# Patient Record
Sex: FEMALE | Race: WHITE | NOT HISPANIC OR LATINO | Employment: FULL TIME | URBAN - NONMETROPOLITAN AREA
[De-identification: names, ages, dates, MRNs, and addresses within clinical notes are randomized per-mention and may not be internally consistent; named-entity substitution may affect disease eponyms.]

---

## 2017-01-23 ENCOUNTER — TREATMENT (OUTPATIENT)
Dept: CARDIOLOGY | Facility: CLINIC | Age: 42
End: 2017-01-23

## 2017-01-23 DIAGNOSIS — R00.2 HEART PALPITATIONS: Primary | ICD-10-CM

## 2017-01-24 ENCOUNTER — OFFICE VISIT (OUTPATIENT)
Dept: CARDIOLOGY | Facility: CLINIC | Age: 42
End: 2017-01-24

## 2017-01-24 VITALS
SYSTOLIC BLOOD PRESSURE: 121 MMHG | HEART RATE: 81 BPM | OXYGEN SATURATION: 98 % | BODY MASS INDEX: 42 KG/M2 | WEIGHT: 246 LBS | RESPIRATION RATE: 16 BRPM | DIASTOLIC BLOOD PRESSURE: 80 MMHG | HEIGHT: 64 IN

## 2017-01-24 DIAGNOSIS — R00.2 HEART PALPITATIONS: Primary | ICD-10-CM

## 2017-01-24 PROCEDURE — 99213 OFFICE O/P EST LOW 20 MIN: CPT | Performed by: INTERNAL MEDICINE

## 2017-01-24 NOTE — PROGRESS NOTES
Rajesh Palmer MD  Anna Urbanen  1975 01/24/2017    Patient Active Problem List   Diagnosis   • Gougerout-Sjoegren syndrome   • Breath shortness   • Acute bronchitis   • Heart palpitations       Dear Rajesh Palmer MD:    Subjective     Anna Andres is a 41 y.o. female with the above medical problems who is here today for follow-up.  Patient is an extensive history of episodes of palpitations in the past.  According to the patient she describes these episodes as her feeling anxious and jittery.  She does report a past heart rate at the time.  She states there blood pressure has been checked in multiple occasions and has been noted to be normal during the episodes.  She states that after 20-30 minutes the episodes resolve spontaneously.  She has undergone multiple evaluations including cardiac catheterization, multiple Holter test, 30 day event monitor, loop recorder testing as well as multiple echocardiograms, all of which have failed to show any evidence of cardiac etiology for this.      Current Outpatient Prescriptions:   •  albuterol (PROVENTIL HFA;VENTOLIN HFA) 108 (90 BASE) MCG/ACT inhaler, Inhale 2 puffs every 4 (four) hours as needed for wheezing., Disp: , Rfl:   •  azelastine (ASTELIN) 0.1 % nasal spray, 2 sprays into each nostril 2 (two) times a day. Use in each nostril as directed, Disp: , Rfl:   •  cyanocobalamin 1000 MCG/ML injection, INJ 1 ML IM Q OTHER WEEK, Disp: , Rfl: 3  •  diclofenac (VOLTAREN) 3 % gel gel, Apply 4 g topically. for 60 days., Disp: , Rfl:   •  DULoxetine (CYMBALTA) 60 MG capsule, TK ONE C PO  QD, Disp: , Rfl: 1  •  fluticasone (FLONASE) 50 MCG/ACT nasal spray, 2 sprays into each nostril daily. Administer 2 sprays in each nostril for each dose., Disp: , Rfl:   •  hydroxychloroquine (PLAQUENIL) 200 MG tablet, TK 1 T PO BID, Disp: , Rfl: 1  •  Ibuprofen-Famotidine (DUEXIS) 800-26.6 MG tablet, Take  by mouth 3 (three) times a day. prn, Disp: , Rfl:   •   labetalol (NORMODYNE) 100 MG tablet, 0.5 tab BID, Disp: 90 tablet, Rfl: 2  •  Loratadine 10 MG capsule, Take  by mouth., Disp: , Rfl:   •  montelukast (SINGULAIR) 10 MG tablet, TK 1 T PO  QPM, Disp: , Rfl: 3  •  Multiple Vitamin (MULTI VITAMIN DAILY PO), Take  by mouth., Disp: , Rfl:   •  naratriptan (AMERGE) 2.5 MG tablet, , Disp: , Rfl: 5  •  O2 (OXYGEN), Inhale 2 L/min every night., Disp: , Rfl:   •  Omega-3 Fatty Acids (FISH OIL) 1000 MG capsule capsule, Take 1,000 mg by mouth., Disp: , Rfl:   •  pantoprazole (PROTONIX) 40 MG EC tablet, TK 1 T PO QD, Disp: , Rfl: 3  •  PULMICORT FLEXHALER 180 MCG/ACT inhaler, USE 1 PUFF PO BID UTD, Disp: , Rfl: 3  •  RESTASIS 0.05 % ophthalmic emulsion, INT 1 GTT IN OU BID, Disp: , Rfl: 3  •  vitamin D (ERGOCALCIFEROL) 79310 UNITS capsule capsule, TK ONE C PO  Q WEEK, Disp: , Rfl: 4    The following portions of the patient's history were reviewed and updated as appropriate: allergies, current medications, past family history, past medical history, past social history, past surgical history and problem list.    Review of Systems   Constitution: Positive for malaise/fatigue. Negative for chills and fever.   HENT: Positive for congestion and headaches (hx migraines). Negative for nosebleeds.    Eyes: Positive for visual disturbance (only with headaches). Negative for blurred vision, pain and redness.   Cardiovascular: Positive for leg swelling. Negative for chest pain, dyspnea on exertion, irregular heartbeat, near-syncope, orthopnea, palpitations, paroxysmal nocturnal dyspnea and syncope.   Respiratory: Positive for cough (occasional), shortness of breath, sleep disturbances due to breathing and wheezing (occasional).    Skin: Negative for rash.   Musculoskeletal: Positive for arthritis, back pain, joint pain and stiffness. Negative for neck pain.   Gastrointestinal: Negative for bloating, abdominal pain, change in bowel habit, constipation, diarrhea, hematemesis, hematochezia,  "melena, nausea and vomiting.   Genitourinary: Negative for dysuria.   Neurological: Positive for dizziness (standing quickly) and light-headedness.   Psychiatric/Behavioral: Negative for depression. The patient does not have insomnia and is not nervous/anxious.        Objective   Blood pressure 121/80, pulse 81, resp. rate 16, height 64\" (162.6 cm), weight 246 lb (112 kg), SpO2 98 %.    Physical Exam   Constitutional: She is oriented to person, place, and time. She appears well-developed and well-nourished.   Obese white female sitting comfortably on chair.   HENT:   Mouth/Throat: Oropharynx is clear and moist.   Eyes: EOM are normal. Pupils are equal, round, and reactive to light.   Neck: Neck supple. No JVD present. No tracheal deviation present. No thyromegaly present.   Cardiovascular: Normal rate, regular rhythm, S1 normal and S2 normal.  Exam reveals no gallop and no friction rub.    No murmur heard.  Pulmonary/Chest: Effort normal and breath sounds normal. No respiratory distress. She has no wheezes. She has no rales.   Abdominal: Soft. Bowel sounds are normal. She exhibits no mass. There is no tenderness.   Musculoskeletal: Normal range of motion. She exhibits no edema.   Lymphadenopathy:     She has no cervical adenopathy.   Neurological: She is alert and oriented to person, place, and time.   Skin: Skin is warm and dry. No rash noted.   Psychiatric: She has a normal mood and affect.       Procedures    Assessment/Plan     1.  Heart palpitations: Patient with a reported episode of palpitations that have not been shown to be related to arrhythmias.  After extensive cardiac testing.  She has also underwent valuation for occult ischemia which has been negative as well as structural heart abnormalities which has been negative.  At this point it appears very unlikely that the patient's episodes are related to a cardiac etiology.  He appears to be related to hormonal versus psychiatric problems.  Further " evaluation is to be done by primary care.  She will need removal of LINQ device once battery runs out, or in the meantime he would continue to provide evaluation for arrhythmias.     Diagnosis Plan   1. Heart palpitations              Return if symptoms worsen or fail to improve.    I appreciate the opportunity to participate in this patient's cardiovascular care.    Best Regards    Abel Valderrama

## 2017-01-24 NOTE — LETTER
January 24, 2017     Rajesh Palmer MD  [address] [facility] [place] [address]    Patient: Anna Andres   YOB: 1975   Date of Visit: 1/24/2017       Dear Dr. Spencer MD:    Thank you for referring Anna Andres to me for evaluation. Below are the relevant portions of my assessment and plan of care.    If you have questions, please do not hesitate to call me. I look forward to following Anna along with you.         Sincerely,        Abel Bustamante MD        CC: No Recipients  Abel Bustamante MD  1/24/2017  4:37 PM  Signed  Rajesh Palmer MD  Anna Urbanen  1975 01/24/2017    Patient Active Problem List   Diagnosis   • Gougerout-Sjoegren syndrome   • Breath shortness   • Acute bronchitis   • Heart palpitations       Dear Rajesh Palmer MD:    Subjective     Anna Andres is a 41 y.o. female with the above medical problems who is here today for follow-up.  Patient is an extensive history of episodes of palpitations in the past.  According to the patient she describes these episodes as her feeling anxious and jittery.  She does report a past heart rate at the time.  She states there blood pressure has been checked in multiple occasions and has been noted to be normal during the episodes.  She states that after 20-30 minutes the episodes resolve spontaneously.  She has undergone multiple evaluations including cardiac catheterization, multiple Holter test, 30 day event monitor, loop recorder testing as well as multiple echocardiograms, all of which have failed to show any evidence of cardiac etiology for this.      Current Outpatient Prescriptions:   •  albuterol (PROVENTIL HFA;VENTOLIN HFA) 108 (90 BASE) MCG/ACT inhaler, Inhale 2 puffs every 4 (four) hours as needed for wheezing., Disp: , Rfl:   •  azelastine (ASTELIN) 0.1 % nasal spray, 2 sprays into each nostril 2 (two) times a day. Use in each nostril as directed, Disp: , Rfl:   •   cyanocobalamin 1000 MCG/ML injection, INJ 1 ML IM Q OTHER WEEK, Disp: , Rfl: 3  •  diclofenac (VOLTAREN) 3 % gel gel, Apply 4 g topically. for 60 days., Disp: , Rfl:   •  DULoxetine (CYMBALTA) 60 MG capsule, TK ONE C PO  QD, Disp: , Rfl: 1  •  fluticasone (FLONASE) 50 MCG/ACT nasal spray, 2 sprays into each nostril daily. Administer 2 sprays in each nostril for each dose., Disp: , Rfl:   •  hydroxychloroquine (PLAQUENIL) 200 MG tablet, TK 1 T PO BID, Disp: , Rfl: 1  •  Ibuprofen-Famotidine (DUEXIS) 800-26.6 MG tablet, Take  by mouth 3 (three) times a day. prn, Disp: , Rfl:   •  labetalol (NORMODYNE) 100 MG tablet, 0.5 tab BID, Disp: 90 tablet, Rfl: 2  •  Loratadine 10 MG capsule, Take  by mouth., Disp: , Rfl:   •  montelukast (SINGULAIR) 10 MG tablet, TK 1 T PO  QPM, Disp: , Rfl: 3  •  Multiple Vitamin (MULTI VITAMIN DAILY PO), Take  by mouth., Disp: , Rfl:   •  naratriptan (AMERGE) 2.5 MG tablet, , Disp: , Rfl: 5  •  O2 (OXYGEN), Inhale 2 L/min every night., Disp: , Rfl:   •  Omega-3 Fatty Acids (FISH OIL) 1000 MG capsule capsule, Take 1,000 mg by mouth., Disp: , Rfl:   •  pantoprazole (PROTONIX) 40 MG EC tablet, TK 1 T PO QD, Disp: , Rfl: 3  •  PULMICORT FLEXHALER 180 MCG/ACT inhaler, USE 1 PUFF PO BID UTD, Disp: , Rfl: 3  •  RESTASIS 0.05 % ophthalmic emulsion, INT 1 GTT IN OU BID, Disp: , Rfl: 3  •  vitamin D (ERGOCALCIFEROL) 94697 UNITS capsule capsule, TK ONE C PO  Q WEEK, Disp: , Rfl: 4    The following portions of the patient's history were reviewed and updated as appropriate: allergies, current medications, past family history, past medical history, past social history, past surgical history and problem list.    Review of Systems   Constitution: Positive for malaise/fatigue. Negative for chills and fever.   HENT: Positive for congestion and headaches (hx migraines). Negative for nosebleeds.    Eyes: Positive for visual disturbance (only with headaches). Negative for blurred vision, pain and redness.  "  Cardiovascular: Positive for leg swelling. Negative for chest pain, dyspnea on exertion, irregular heartbeat, near-syncope, orthopnea, palpitations, paroxysmal nocturnal dyspnea and syncope.   Respiratory: Positive for cough (occasional), shortness of breath, sleep disturbances due to breathing and wheezing (occasional).    Skin: Negative for rash.   Musculoskeletal: Positive for arthritis, back pain, joint pain and stiffness. Negative for neck pain.   Gastrointestinal: Negative for bloating, abdominal pain, change in bowel habit, constipation, diarrhea, hematemesis, hematochezia, melena, nausea and vomiting.   Genitourinary: Negative for dysuria.   Neurological: Positive for dizziness (standing quickly) and light-headedness.   Psychiatric/Behavioral: Negative for depression. The patient does not have insomnia and is not nervous/anxious.        Objective   Blood pressure 121/80, pulse 81, resp. rate 16, height 64\" (162.6 cm), weight 246 lb (112 kg), SpO2 98 %.    Physical Exam   Constitutional: She is oriented to person, place, and time. She appears well-developed and well-nourished.   Obese white female sitting comfortably on chair.   HENT:   Mouth/Throat: Oropharynx is clear and moist.   Eyes: EOM are normal. Pupils are equal, round, and reactive to light.   Neck: Neck supple. No JVD present. No tracheal deviation present. No thyromegaly present.   Cardiovascular: Normal rate, regular rhythm, S1 normal and S2 normal.  Exam reveals no gallop and no friction rub.    No murmur heard.  Pulmonary/Chest: Effort normal and breath sounds normal. No respiratory distress. She has no wheezes. She has no rales.   Abdominal: Soft. Bowel sounds are normal. She exhibits no mass. There is no tenderness.   Musculoskeletal: Normal range of motion. She exhibits no edema.   Lymphadenopathy:     She has no cervical adenopathy.   Neurological: She is alert and oriented to person, place, and time.   Skin: Skin is warm and dry. No rash " noted.   Psychiatric: She has a normal mood and affect.       Procedures    Assessment/Plan     1.  Heart palpitations: Patient with a reported episode of palpitations that have not been shown to be related to arrhythmias.  After extensive cardiac testing.  She has also underwent valuation for occult ischemia which has been negative as well as structural heart abnormalities which has been negative.  At this point it appears very unlikely that the patient's episodes are related to a cardiac etiology.  He appears to be related to hormonal versus psychiatric problems.  Further evaluation is to be done by primary care.  She will need removal of LINQ device once battery runs out, or in the meantime he would continue to provide evaluation for arrhythmias.     Diagnosis Plan   1. Heart palpitations              Return if symptoms worsen or fail to improve.    I appreciate the opportunity to participate in this patient's cardiovascular care.    Best Regards    Abel Valderrama

## 2017-01-24 NOTE — MR AVS SNAPSHOT
Anna MICKIE Andres   1/24/2017 2:40 PM   Office Visit    Dept Phone:  [phone]   Encounter #:  [id number]    Provider:  Abel Bustamante MD   Department:  [facility] CARDIOLOGY                Your Full Care Plan              Your Updated Medication List          This list is accurate as of: 1/24/17  4:00 PM.  Always use your most recent med list.                albuterol 108 (90 BASE) MCG/ACT inhaler   Commonly known as:  PROVENTIL HFA;VENTOLIN HFA       azelastine 0.1 % nasal spray   Commonly known as:  ASTELIN       cyanocobalamin 1000 MCG/ML injection       diclofenac 3 % gel gel   Commonly known as:  VOLTAREN       DUEXIS 800-26.6 MG tablet   Generic drug:  Ibuprofen-Famotidine       DULoxetine 60 MG capsule   Commonly known as:  CYMBALTA       fish oil 1000 MG capsule capsule       fluticasone 50 MCG/ACT nasal spray   Commonly known as:  FLONASE       hydroxychloroquine 200 MG tablet   Commonly known as:  PLAQUENIL       labetalol 100 MG tablet   Commonly known as:  NORMODYNE   0.5 tab BID       Loratadine 10 MG capsule       montelukast 10 MG tablet   Commonly known as:  SINGULAIR       MULTI VITAMIN DAILY PO       naratriptan 2.5 MG tablet   Commonly known as:  AMERGE       O2   Commonly known as:  OXYGEN       pantoprazole 40 MG EC tablet   Commonly known as:  PROTONIX       PULMICORT FLEXHALER 180 MCG/ACT inhaler   Generic drug:  budesonide       RESTASIS 0.05 % ophthalmic emulsion   Generic drug:  cycloSPORINE       vitamin D 01794 UNITS capsule capsule   Commonly known as:  ERGOCALCIFEROL               You Were Diagnosed With        Codes Comments    Heart palpitations    -  Primary ICD-10-CM: R00.2  ICD-9-CM: 785.1       Instructions     None    Patient Instructions History      Upcoming Appointments     Visit Type Date Time Department    FOLLOW UP 1/24/2017  2:40 PM [facility] Signup     Our records indicate that  "you have an active [facility] [employer] account.    You can view your After Visit Summary by going to [employer] and logging in with your [employer] username and password.  If you don't have a [employer] username and password but a parent or guardian has access to your record, the parent or guardian should login with their own [employer] username and password and access your record to view the After Visit Summary.    If you have questions, you can email [employer] [email or web address] or call [phone] to talk to our [employer] staff.  Remember, [employer] is NOT to be used for urgent needs.  For medical emergencies, dial 911.               Other Info from Your Visit           Allergies     Bupropion Hcl      Celecoxib      Meloxicam      Metaxalone      Moxifloxacin Hcl In Nacl        Reason for Visit     Hypertension Follow Up,  s/p E.R. 12/01/16      Vital Signs     Blood Pressure Pulse Respirations Height Weight Oxygen Saturation    121/80 (BP Location: Right arm, Patient Position: Sitting, Cuff Size: Adult) 81 16 64\" (162.6 cm) 246 lb (112 kg) 98%    Body Mass Index Smoking Status                42.23 kg/m2 Former Smoker          Problems and Diagnoses Noted     Heart palpitations        "

## 2017-02-23 ENCOUNTER — TREATMENT (OUTPATIENT)
Dept: CARDIOLOGY | Facility: CLINIC | Age: 42
End: 2017-02-23

## 2017-02-23 DIAGNOSIS — R00.2 HEART PALPITATIONS: Primary | ICD-10-CM

## 2017-03-27 ENCOUNTER — TREATMENT (OUTPATIENT)
Dept: CARDIOLOGY | Facility: CLINIC | Age: 42
End: 2017-03-27

## 2017-03-27 DIAGNOSIS — R00.2 PALPITATIONS: Primary | ICD-10-CM

## 2017-03-27 PROCEDURE — 93299 PR REM INTERROG ICPMS/SCRMS <30 D TECH REVIEW: CPT | Performed by: INTERNAL MEDICINE

## 2017-03-27 PROCEDURE — 93298 REM INTERROG DEV EVAL SCRMS: CPT | Performed by: INTERNAL MEDICINE

## 2017-04-27 ENCOUNTER — TREATMENT (OUTPATIENT)
Dept: CARDIOLOGY | Facility: CLINIC | Age: 42
End: 2017-04-27

## 2017-04-27 DIAGNOSIS — R00.2 PALPITATIONS: Primary | ICD-10-CM

## 2017-04-27 PROCEDURE — 93299 PR REM INTERROG ICPMS/SCRMS <30 D TECH REVIEW: CPT | Performed by: INTERNAL MEDICINE

## 2017-04-27 PROCEDURE — 93298 REM INTERROG DEV EVAL SCRMS: CPT | Performed by: INTERNAL MEDICINE

## 2017-05-08 ENCOUNTER — TELEPHONE (OUTPATIENT)
Dept: CARDIOLOGY | Facility: CLINIC | Age: 42
End: 2017-05-08

## 2017-05-29 ENCOUNTER — TREATMENT (OUTPATIENT)
Dept: CARDIOLOGY | Facility: CLINIC | Age: 42
End: 2017-05-29

## 2017-05-29 DIAGNOSIS — R00.2 PALPITATIONS: Primary | ICD-10-CM

## 2017-05-29 PROCEDURE — 93298 REM INTERROG DEV EVAL SCRMS: CPT | Performed by: INTERNAL MEDICINE

## 2017-06-06 ENCOUNTER — TELEPHONE (OUTPATIENT)
Dept: CARDIOLOGY | Facility: CLINIC | Age: 42
End: 2017-06-06

## 2017-06-06 NOTE — TELEPHONE ENCOUNTER
----- Message from Stephanie Rayo sent at 6/6/2017  1:46 PM EDT -----  Regarding: loop recorder  Contact: [phone]   This pt said she seen dr. smith but normally see's dr. anderson and that dr. smith and herself had discussed having her loop recorder removed and she said she wishes to follow through with this and wanted to see what her next step should be.     Thank you.

## 2017-06-06 NOTE — TELEPHONE ENCOUNTER
I contacted the patient and she is okay with having the loop recorder remain in place until the battery runs out.  This should occur about January 2020.  We will need to make an appointment with the patient at that time for possible removal of the loop recorder.

## 2017-06-28 ENCOUNTER — TREATMENT (OUTPATIENT)
Dept: CARDIOLOGY | Facility: CLINIC | Age: 42
End: 2017-06-28

## 2017-06-28 DIAGNOSIS — I63.9 CRYPTOGENIC STROKE (HCC): Primary | ICD-10-CM

## 2017-07-01 PROCEDURE — 93298 REM INTERROG DEV EVAL SCRMS: CPT | Performed by: INTERNAL MEDICINE

## 2017-07-11 ENCOUNTER — TREATMENT (OUTPATIENT)
Dept: CARDIOLOGY | Facility: CLINIC | Age: 42
End: 2017-07-11

## 2017-07-11 DIAGNOSIS — I63.9 CRYPTOGENIC STROKE (HCC): Primary | ICD-10-CM

## 2017-07-28 ENCOUNTER — TREATMENT (OUTPATIENT)
Dept: CARDIOLOGY | Facility: CLINIC | Age: 42
End: 2017-07-28

## 2017-07-28 DIAGNOSIS — R00.2 PALPITATIONS: Primary | ICD-10-CM

## 2017-08-28 ENCOUNTER — TREATMENT (OUTPATIENT)
Dept: CARDIOLOGY | Facility: CLINIC | Age: 42
End: 2017-08-28

## 2017-08-28 DIAGNOSIS — R00.2 HEART PALPITATIONS: Primary | ICD-10-CM

## 2017-08-28 PROCEDURE — 93298 REM INTERROG DEV EVAL SCRMS: CPT | Performed by: INTERNAL MEDICINE

## 2017-10-09 ENCOUNTER — TELEPHONE (OUTPATIENT)
Dept: CARDIOLOGY | Facility: CLINIC | Age: 42
End: 2017-10-09

## 2017-10-09 NOTE — TELEPHONE ENCOUNTER
Called pt and advised her that I had gotten a reading from her loop recorder that stated the battery was low on it.  stated to ask her and see if she wanted to make an apt to come in and be seen to have it removed or leave it in.   I called pt and she stated she wanted to have it removed. I made her an apt on 11.1.17 at 8:20 am with .

## 2017-10-19 ENCOUNTER — TELEPHONE (OUTPATIENT)
Dept: CARDIOLOGY | Facility: CLINIC | Age: 42
End: 2017-10-19

## 2017-10-19 NOTE — TELEPHONE ENCOUNTER
Called to advise her that I was going to canceling her apt on 11.1.17 with  because he stated that she would need to see  to have her loop recorder removed. I made her an apt with  on 12.11.17 at 8:40 am.   Called pt no answer left message.

## 2017-12-11 ENCOUNTER — OFFICE VISIT (OUTPATIENT)
Dept: CARDIOLOGY | Facility: CLINIC | Age: 42
End: 2017-12-11

## 2017-12-11 VITALS
DIASTOLIC BLOOD PRESSURE: 83 MMHG | WEIGHT: 213.8 LBS | SYSTOLIC BLOOD PRESSURE: 124 MMHG | BODY MASS INDEX: 36.5 KG/M2 | HEIGHT: 64 IN | HEART RATE: 80 BPM

## 2017-12-11 DIAGNOSIS — R55 NEAR SYNCOPE: Primary | ICD-10-CM

## 2017-12-11 DIAGNOSIS — R42 DIZZINESS: ICD-10-CM

## 2017-12-11 PROCEDURE — 93000 ELECTROCARDIOGRAM COMPLETE: CPT | Performed by: INTERNAL MEDICINE

## 2017-12-11 PROCEDURE — 99212 OFFICE O/P EST SF 10 MIN: CPT | Performed by: INTERNAL MEDICINE

## 2017-12-11 NOTE — PROGRESS NOTES
Rajesh Pamler MD  Anna Andres  1975 12/11/2017    Patient Active Problem List   Diagnosis   • Gougerout-Sjoegren syndrome   • Breath shortness   • Acute bronchitis   • Heart palpitations   • Dizziness   • Near syncope       Dear Rajesh Palmer MD:    Subjective     Anna Andres is a 42 y.o. female with the problems as listed above, presents    Chief complaint: Follow-up for history of dizziness and syncope.    History of Present Illness:Louis Andres is a pleasant 40-year-old  female with history of dizziness and near syncope in the past for which she has had a loop recorder implanted in 2015.  She is here for a cardiology follow-up.  She denies any episodes of significant dizziness or syncope in a long time.  She is interested in getting the loop recorder out if the batteries running out.      Allergies   Allergen Reactions   • Bupropion Hcl    • Celecoxib    • Meloxicam    • Metaxalone    • Moxifloxacin Hcl In Nacl    :      Current Outpatient Prescriptions:   •  albuterol (PROVENTIL HFA;VENTOLIN HFA) 108 (90 BASE) MCG/ACT inhaler, Inhale 2 puffs every 4 (four) hours as needed for wheezing., Disp: , Rfl:   •  azelastine (ASTELIN) 0.1 % nasal spray, 2 sprays into each nostril 2 (two) times a day. Use in each nostril as directed, Disp: , Rfl:   •  cyanocobalamin 1000 MCG/ML injection, INJ 1 ML IM Q OTHER WEEK, Disp: , Rfl: 3  •  diclofenac (VOLTAREN) 3 % gel gel, Apply 4 g topically. for 60 days., Disp: , Rfl:   •  DULoxetine (CYMBALTA) 60 MG capsule, TK ONE C PO  QD, Disp: , Rfl: 1  •  fluticasone (FLONASE) 50 MCG/ACT nasal spray, 2 sprays into each nostril daily. Administer 2 sprays in each nostril for each dose., Disp: , Rfl:   •  hydroxychloroquine (PLAQUENIL) 200 MG tablet, TK 1 T PO BID, Disp: , Rfl: 1  •  Ibuprofen-Famotidine (DUEXIS) 800-26.6 MG tablet, Take  by mouth 3 (three) times a day. prn, Disp: , Rfl:   •  labetalol (NORMODYNE) 100 MG tablet, 0.5 tab BID, Disp: 90  "tablet, Rfl: 2  •  Loratadine 10 MG capsule, Take  by mouth., Disp: , Rfl:   •  montelukast (SINGULAIR) 10 MG tablet, TK 1 T PO  QPM, Disp: , Rfl: 3  •  Multiple Vitamin (MULTI VITAMIN DAILY PO), Take  by mouth., Disp: , Rfl:   •  naratriptan (AMERGE) 2.5 MG tablet, , Disp: , Rfl: 5  •  O2 (OXYGEN), Inhale 2 L/min every night., Disp: , Rfl:   •  Omega-3 Fatty Acids (FISH OIL) 1000 MG capsule capsule, Take 1,000 mg by mouth., Disp: , Rfl:   •  pantoprazole (PROTONIX) 40 MG EC tablet, TK 1 T PO QD, Disp: , Rfl: 3  •  PULMICORT FLEXHALER 180 MCG/ACT inhaler, USE 1 PUFF PO BID UTD, Disp: , Rfl: 3  •  RESTASIS 0.05 % ophthalmic emulsion, INT 1 GTT IN OU BID, Disp: , Rfl: 3  •  vitamin D (ERGOCALCIFEROL) 60424 UNITS capsule capsule, TK ONE C PO  Q WEEK, Disp: , Rfl: 4      The following portions of the patient's history were reviewed and updated as appropriate: allergies, current medications, past family history, past medical history, past social history, past surgical history and problem list.    Social History   Substance Use Topics   • Smoking status: Former Smoker     Packs/day: 0.50     Types: Cigarettes     Quit date: 2011   • Smokeless tobacco: Never Used   • Alcohol use No       Review of Systems   Constitution: Negative for chills, fever, weakness, malaise/fatigue, weight gain and weight loss.   HENT: Negative for congestion and nosebleeds.    Cardiovascular: Positive for chest pain, leg swelling (\"a little bit\") and palpitations (sometimes). Negative for irregular heartbeat and orthopnea.   Respiratory: Positive for wheezing (uses inhaler). Negative for cough and hemoptysis. Shortness of breath: allergies.    Gastrointestinal: Negative for abdominal pain, change in bowel habit, hematemesis, melena and vomiting.   Genitourinary: Negative for dysuria, frequency and hematuria.   Neurological: Negative for focal weakness, headaches and light-headedness. Dizziness: earlier today.       Objective   Vitals:    12/11/17 " "1238   BP: 124/83   BP Location: Right arm   Patient Position: Sitting   Cuff Size: Adult   Pulse: 80   Weight: 97 kg (213 lb 12.8 oz)   Height: 162.6 cm (64\")     Body mass index is 36.7 kg/(m^2).        Physical Exam   Constitutional: She is oriented to person, place, and time. She appears well-developed and well-nourished.   HENT:   Head: Normocephalic.   Eyes: Conjunctivae and EOM are normal.   Neck: Normal range of motion. Neck supple. No JVD present. No tracheal deviation present. No thyromegaly present.   Cardiovascular: Normal rate, regular rhythm, S1 normal and S2 normal.  Exam reveals no gallop, no S3, no S4 and no friction rub.    No murmur heard.  Pulmonary/Chest: Breath sounds normal. No respiratory distress. She has no wheezes. She has no rales.   Abdominal: Soft. Bowel sounds are normal. She exhibits no mass. There is no tenderness.   Musculoskeletal: She exhibits no edema.   Neurological: She is alert and oriented to person, place, and time. No cranial nerve deficit.   Skin: Skin is warm and dry.   Psychiatric: She has a normal mood and affect.       Lab Results   Component Value Date     12/01/2016    K 3.8 12/01/2016     12/01/2016    CO2 30.1 12/01/2016    BUN 12 12/01/2016    CREATININE 0.93 12/01/2016    GLUCOSE 74 12/01/2016    CALCIUM 9.4 12/01/2016    AST 26 12/01/2016    ALT 26 12/01/2016    ALKPHOS 60 12/01/2016    LABIL2 1.5 12/01/2016     Lab Results   Component Value Date    CKTOTAL 172 12/01/2016     Lab Results   Component Value Date    WBC 5.21 12/01/2016    HGB 13.8 12/01/2016    HCT 41.3 12/01/2016     12/01/2016     Lab Results   Component Value Date    INR <0.90 05/13/2014     Lab Results   Component Value Date    MG 2.0 12/01/2016     Lab Results   Component Value Date    TSH 3.029 12/01/2016    CHLPL 222 (H) 06/11/2015    TRIG 75 06/11/2015    HDL 74 06/11/2015     (H) 06/11/2015      Lab Results   Component Value Date    BNP 16.0 12/01/2016     Echo "     ECG 12 Lead  Date/Time: 12/11/2017 12:34 PM  Performed by: YASMANY TEJADA  Authorized by: MILTON BAE   Rhythm: sinus rhythm  Conduction: conduction normal  ST Segments: ST segments normal  T Waves: T waves normal  Other: no other findings  Clinical impression: normal ECG            Assessment/Plan       Diagnosis Plan   1. Near syncope  ECG 12 Lead   2. Dizziness          Recommendations:    We will have the loop recorder interrogated and if the battery is running out, will go ahead and remove the loop recorder.  Return in about 7 months (around 7/11/2018).    As always, I appreciate very much the opportunity to participate in the cardiovascular care of your patients.      With Best Regards,    Milton Bae MD, FACC

## 2018-01-29 ENCOUNTER — TREATMENT (OUTPATIENT)
Dept: CARDIOLOGY | Facility: CLINIC | Age: 43
End: 2018-01-29

## 2018-01-29 DIAGNOSIS — R00.2 HEART PALPITATIONS: Primary | ICD-10-CM

## 2018-01-29 PROCEDURE — 93299 PR REM INTERROG ICPMS/SCRMS <30 D TECH REVIEW: CPT | Performed by: INTERNAL MEDICINE

## 2018-01-29 PROCEDURE — 93298 REM INTERROG DEV EVAL SCRMS: CPT | Performed by: INTERNAL MEDICINE

## 2018-02-28 ENCOUNTER — TREATMENT (OUTPATIENT)
Dept: CARDIOLOGY | Facility: CLINIC | Age: 43
End: 2018-02-28

## 2018-02-28 DIAGNOSIS — R00.2 HEART PALPITATIONS: Primary | ICD-10-CM

## 2018-02-28 PROCEDURE — 93298 REM INTERROG DEV EVAL SCRMS: CPT | Performed by: INTERNAL MEDICINE

## 2018-02-28 PROCEDURE — 93299 PR REM INTERROG ICPMS/SCRMS <30 D TECH REVIEW: CPT | Performed by: INTERNAL MEDICINE

## 2018-03-10 ENCOUNTER — LAB (OUTPATIENT)
Dept: LAB | Facility: HOSPITAL | Age: 43
End: 2018-03-10
Attending: INTERNAL MEDICINE

## 2018-03-10 ENCOUNTER — TRANSCRIBE ORDERS (OUTPATIENT)
Dept: PULMONOLOGY | Facility: HOSPITAL | Age: 43
End: 2018-03-10

## 2018-03-10 DIAGNOSIS — R53.83 TIREDNESS: ICD-10-CM

## 2018-03-10 DIAGNOSIS — E53.8 BIOTIN-(PROPIONYL-COA-CARBOXYLASE) LIGASE DEFICIENCY: ICD-10-CM

## 2018-03-10 DIAGNOSIS — Z13.6 SCREENING FOR ISCHEMIC HEART DISEASE: ICD-10-CM

## 2018-03-10 DIAGNOSIS — E55.9 AVITAMINOSIS D: Primary | ICD-10-CM

## 2018-03-10 DIAGNOSIS — I10 ESSENTIAL HYPERTENSION, MALIGNANT: ICD-10-CM

## 2018-03-10 DIAGNOSIS — E55.9 AVITAMINOSIS D: ICD-10-CM

## 2018-03-10 LAB
ALBUMIN SERPL-MCNC: 4 G/DL (ref 3.5–5)
ALBUMIN/GLOB SERPL: 1.5 G/DL (ref 1.5–2.5)
ALP SERPL-CCNC: 48 U/L (ref 35–104)
ALT SERPL W P-5'-P-CCNC: 34 U/L (ref 10–36)
ANION GAP SERPL CALCULATED.3IONS-SCNC: 2.3 MMOL/L (ref 3.6–11.2)
AST SERPL-CCNC: 28 U/L (ref 10–30)
BASOPHILS # BLD AUTO: 0.02 10*3/MM3 (ref 0–0.3)
BASOPHILS NFR BLD AUTO: 0.5 % (ref 0–2)
BILIRUB SERPL-MCNC: 0.9 MG/DL (ref 0.2–1.8)
BUN BLD-MCNC: 17 MG/DL (ref 7–21)
BUN/CREAT SERPL: 21.8 (ref 7–25)
CALCIUM SPEC-SCNC: 9 MG/DL (ref 7.7–10)
CHLORIDE SERPL-SCNC: 107 MMOL/L (ref 99–112)
CHOLEST SERPL-MCNC: 222 MG/DL (ref 0–200)
CO2 SERPL-SCNC: 30.7 MMOL/L (ref 24.3–31.9)
CREAT BLD-MCNC: 0.78 MG/DL (ref 0.43–1.29)
DEPRECATED RDW RBC AUTO: 41.2 FL (ref 37–54)
EOSINOPHIL # BLD AUTO: 0.22 10*3/MM3 (ref 0–0.7)
EOSINOPHIL NFR BLD AUTO: 5.7 % (ref 0–5)
ERYTHROCYTE [DISTWIDTH] IN BLOOD BY AUTOMATED COUNT: 12.5 % (ref 11.5–14.5)
FOLATE SERPL-MCNC: 20.52 NG/ML (ref 5.4–20)
GFR SERPL CREATININE-BSD FRML MDRD: 81 ML/MIN/1.73
GLOBULIN UR ELPH-MCNC: 2.7 GM/DL
GLUCOSE BLD-MCNC: 85 MG/DL (ref 70–110)
HCT VFR BLD AUTO: 40.9 % (ref 37–47)
HDLC SERPL-MCNC: 64 MG/DL (ref 60–100)
HGB BLD-MCNC: 13.6 G/DL (ref 12–16)
IMM GRANULOCYTES # BLD: 0.02 10*3/MM3 (ref 0–0.03)
IMM GRANULOCYTES NFR BLD: 0.5 % (ref 0–0.5)
LDLC SERPL CALC-MCNC: 145 MG/DL (ref 0–100)
LDLC/HDLC SERPL: 2.27 {RATIO}
LYMPHOCYTES # BLD AUTO: 1.44 10*3/MM3 (ref 1–3)
LYMPHOCYTES NFR BLD AUTO: 37.5 % (ref 21–51)
MCH RBC QN AUTO: 30.8 PG (ref 27–33)
MCHC RBC AUTO-ENTMCNC: 33.3 G/DL (ref 33–37)
MCV RBC AUTO: 92.5 FL (ref 80–94)
MONOCYTES # BLD AUTO: 0.34 10*3/MM3 (ref 0.1–0.9)
MONOCYTES NFR BLD AUTO: 8.9 % (ref 0–10)
NEUTROPHILS # BLD AUTO: 1.8 10*3/MM3 (ref 1.4–6.5)
NEUTROPHILS NFR BLD AUTO: 46.9 % (ref 30–70)
OSMOLALITY SERPL CALC.SUM OF ELEC: 280.2 MOSM/KG (ref 273–305)
PLATELET # BLD AUTO: 219 10*3/MM3 (ref 130–400)
PMV BLD AUTO: 9.7 FL (ref 6–10)
POTASSIUM BLD-SCNC: 4.2 MMOL/L (ref 3.5–5.3)
PROT SERPL-MCNC: 6.7 G/DL (ref 6–8)
RBC # BLD AUTO: 4.42 10*6/MM3 (ref 4.2–5.4)
SODIUM BLD-SCNC: 140 MMOL/L (ref 135–153)
T4 SERPL-MCNC: 7.2 MCG/DL (ref 4.5–10.9)
TRIGL SERPL-MCNC: 65 MG/DL (ref 0–150)
TSH SERPL DL<=0.05 MIU/L-ACNC: 2.57 MIU/ML (ref 0.55–4.78)
VIT B12 BLD-MCNC: 972 PG/ML (ref 211–911)
VLDLC SERPL-MCNC: 13 MG/DL
WBC NRBC COR # BLD: 3.84 10*3/MM3 (ref 4.5–12.5)

## 2018-03-10 PROCEDURE — 84443 ASSAY THYROID STIM HORMONE: CPT

## 2018-03-10 PROCEDURE — 85025 COMPLETE CBC W/AUTO DIFF WBC: CPT

## 2018-03-10 PROCEDURE — 84436 ASSAY OF TOTAL THYROXINE: CPT

## 2018-03-10 PROCEDURE — 84480 ASSAY TRIIODOTHYRONINE (T3): CPT

## 2018-03-10 PROCEDURE — 80061 LIPID PANEL: CPT

## 2018-03-10 PROCEDURE — 82746 ASSAY OF FOLIC ACID SERUM: CPT

## 2018-03-10 PROCEDURE — 36415 COLL VENOUS BLD VENIPUNCTURE: CPT

## 2018-03-10 PROCEDURE — 82607 VITAMIN B-12: CPT

## 2018-03-10 PROCEDURE — 80053 COMPREHEN METABOLIC PANEL: CPT

## 2018-03-12 LAB — T3 SERPL-MCNC: 87 NG/DL (ref 71–180)

## 2018-03-28 ENCOUNTER — TREATMENT (OUTPATIENT)
Dept: CARDIOLOGY | Facility: CLINIC | Age: 43
End: 2018-03-28

## 2018-03-28 DIAGNOSIS — R00.2 HEART PALPITATIONS: Primary | ICD-10-CM

## 2018-04-30 ENCOUNTER — TREATMENT (OUTPATIENT)
Dept: CARDIOLOGY | Facility: CLINIC | Age: 43
End: 2018-04-30

## 2018-04-30 DIAGNOSIS — R00.2 HEART PALPITATIONS: Primary | ICD-10-CM

## 2018-04-30 PROCEDURE — 93298 REM INTERROG DEV EVAL SCRMS: CPT | Performed by: INTERNAL MEDICINE

## 2018-04-30 PROCEDURE — 93299 PR REM INTERROG ICPMS/SCRMS <30 D TECH REVIEW: CPT | Performed by: INTERNAL MEDICINE

## 2018-05-31 ENCOUNTER — TREATMENT (OUTPATIENT)
Dept: CARDIOLOGY | Facility: CLINIC | Age: 43
End: 2018-05-31

## 2018-05-31 DIAGNOSIS — R00.2 HEART PALPITATIONS: ICD-10-CM

## 2018-06-29 ENCOUNTER — TREATMENT (OUTPATIENT)
Dept: CARDIOLOGY | Facility: CLINIC | Age: 43
End: 2018-06-29

## 2018-06-29 DIAGNOSIS — R00.2 HEART PALPITATIONS: Primary | ICD-10-CM

## 2018-06-29 PROCEDURE — 93298 REM INTERROG DEV EVAL SCRMS: CPT | Performed by: INTERNAL MEDICINE

## 2018-06-29 PROCEDURE — 93299 PR REM INTERROG ICPMS/SCRMS <30 D TECH REVIEW: CPT | Performed by: INTERNAL MEDICINE

## 2018-07-11 ENCOUNTER — LAB (OUTPATIENT)
Dept: LAB | Facility: HOSPITAL | Age: 43
End: 2018-07-11
Attending: INTERNAL MEDICINE

## 2018-07-11 ENCOUNTER — TRANSCRIBE ORDERS (OUTPATIENT)
Dept: PULMONOLOGY | Facility: HOSPITAL | Age: 43
End: 2018-07-11

## 2018-07-11 DIAGNOSIS — R53.83 FATIGUE, UNSPECIFIED TYPE: ICD-10-CM
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
## 2018-07-11 DIAGNOSIS — I10 ESSENTIAL HYPERTENSION, MALIGNANT: ICD-10-CM

## 2018-07-11 LAB
ALBUMIN SERPL-MCNC: 4.6 G/DL (ref 3.5–5)
ALBUMIN/GLOB SERPL: 1.5 G/DL (ref 1.5–2.5)
ALP SERPL-CCNC: 54 U/L (ref 35–104)
ALT SERPL W P-5'-P-CCNC: 41 U/L (ref 10–36)
ANION GAP SERPL CALCULATED.3IONS-SCNC: 3.7 MMOL/L (ref 3.6–11.2)
AST SERPL-CCNC: 29 U/L (ref 10–30)
BASOPHILS # BLD AUTO: 0.03 10*3/MM3 (ref 0–0.3)
BASOPHILS NFR BLD AUTO: 0.5 % (ref 0–2)
BILIRUB SERPL-MCNC: 0.7 MG/DL (ref 0.2–1.8)
BUN BLD-MCNC: 14 MG/DL (ref 7–21)
BUN/CREAT SERPL: 17.3 (ref 7–25)
CALCIUM SPEC-SCNC: 9.5 MG/DL (ref 7.7–10)
CHLORIDE SERPL-SCNC: 106 MMOL/L (ref 99–112)
CO2 SERPL-SCNC: 29.3 MMOL/L (ref 24.3–31.9)
CREAT BLD-MCNC: 0.81 MG/DL (ref 0.43–1.29)
DEPRECATED RDW RBC AUTO: 41.1 FL (ref 37–54)
EOSINOPHIL # BLD AUTO: 0.22 10*3/MM3 (ref 0–0.7)
EOSINOPHIL NFR BLD AUTO: 3.6 % (ref 0–5)
ERYTHROCYTE [DISTWIDTH] IN BLOOD BY AUTOMATED COUNT: 12.3 % (ref 11.5–14.5)
GFR SERPL CREATININE-BSD FRML MDRD: 77 ML/MIN/1.73
GLOBULIN UR ELPH-MCNC: 3 GM/DL
GLUCOSE BLD-MCNC: 85 MG/DL (ref 70–110)
HCT VFR BLD AUTO: 42.5 % (ref 37–47)
HGB BLD-MCNC: 14.4 G/DL (ref 12–16)
IMM GRANULOCYTES # BLD: 0.04 10*3/MM3 (ref 0–0.03)
IMM GRANULOCYTES NFR BLD: 0.6 % (ref 0–0.5)
LYMPHOCYTES # BLD AUTO: 1.98 10*3/MM3 (ref 1–3)
LYMPHOCYTES NFR BLD AUTO: 32 % (ref 21–51)
MCH RBC QN AUTO: 31.6 PG (ref 27–33)
MCHC RBC AUTO-ENTMCNC: 33.9 G/DL (ref 33–37)
MCV RBC AUTO: 93.2 FL (ref 80–94)
MONOCYTES # BLD AUTO: 0.35 10*3/MM3 (ref 0.1–0.9)
MONOCYTES NFR BLD AUTO: 5.7 % (ref 0–10)
NEUTROPHILS # BLD AUTO: 3.57 10*3/MM3 (ref 1.4–6.5)
NEUTROPHILS NFR BLD AUTO: 57.6 % (ref 30–70)
OSMOLALITY SERPL CALC.SUM OF ELEC: 277.3 MOSM/KG (ref 273–305)
PLATELET # BLD AUTO: 226 10*3/MM3 (ref 130–400)
PMV BLD AUTO: 9.7 FL (ref 6–10)
POTASSIUM BLD-SCNC: 3.9 MMOL/L (ref 3.5–5.3)
PROT SERPL-MCNC: 7.6 G/DL (ref 6–8)
RBC # BLD AUTO: 4.56 10*6/MM3 (ref 4.2–5.4)
SODIUM BLD-SCNC: 139 MMOL/L (ref 135–153)
T3RU NFR SERPL: 29.6 % (ref 22.5–37)
T4 SERPL-MCNC: 8.8 MCG/DL (ref 4.5–10.9)
TSH SERPL DL<=0.05 MIU/L-ACNC: 2.9 MIU/ML (ref 0.55–4.78)
WBC NRBC COR # BLD: 6.19 10*3/MM3 (ref 4.5–12.5)

## 2018-07-11 PROCEDURE — 80053 COMPREHEN METABOLIC PANEL: CPT

## 2018-07-11 PROCEDURE — 85025 COMPLETE CBC W/AUTO DIFF WBC: CPT

## 2018-07-11 PROCEDURE — 84479 ASSAY OF THYROID (T3 OR T4): CPT

## 2018-07-11 PROCEDURE — 84443 ASSAY THYROID STIM HORMONE: CPT

## 2018-07-11 PROCEDURE — 84436 ASSAY OF TOTAL THYROXINE: CPT

## 2018-07-11 PROCEDURE — 86376 MICROSOMAL ANTIBODY EACH: CPT

## 2018-07-11 PROCEDURE — 86800 THYROGLOBULIN ANTIBODY: CPT

## 2018-07-11 PROCEDURE — 86618 LYME DISEASE ANTIBODY: CPT

## 2018-07-11 PROCEDURE — 86757 RICKETTSIA ANTIBODY: CPT

## 2018-07-11 PROCEDURE — 36415 COLL VENOUS BLD VENIPUNCTURE: CPT

## 2018-07-13 LAB
B BURGDOR IGG+IGM SER-ACNC: <0.91 ISR (ref 0–0.9)
R RICKETTSI IGG SER QL IA: NEGATIVE
THYROGLOB AB SERPL-ACNC: <1 IU/ML (ref 0–0.9)
THYROPEROXIDASE AB SERPL-ACNC: 12 IU/ML (ref 0–34)

## 2018-08-30 ENCOUNTER — TREATMENT (OUTPATIENT)
Dept: CARDIOLOGY | Facility: CLINIC | Age: 43
End: 2018-08-30

## 2018-08-30 DIAGNOSIS — R00.2 HEART PALPITATIONS: Primary | ICD-10-CM

## 2018-08-30 PROCEDURE — 93298 REM INTERROG DEV EVAL SCRMS: CPT | Performed by: INTERNAL MEDICINE

## 2018-08-30 PROCEDURE — 93299 PR REM INTERROG ICPMS/SCRMS <30 D TECH REVIEW: CPT | Performed by: INTERNAL MEDICINE

## 2018-09-28 ENCOUNTER — TREATMENT (OUTPATIENT)
Dept: CARDIOLOGY | Facility: CLINIC | Age: 43
End: 2018-09-28

## 2018-09-28 DIAGNOSIS — R00.2 HEART PALPITATIONS: Primary | ICD-10-CM

## 2018-09-28 PROCEDURE — 93298 REM INTERROG DEV EVAL SCRMS: CPT | Performed by: INTERNAL MEDICINE

## 2018-09-28 PROCEDURE — 93299 PR REM INTERROG ICPMS/SCRMS <30 D TECH REVIEW: CPT | Performed by: INTERNAL MEDICINE

## 2018-10-29 ENCOUNTER — TREATMENT (OUTPATIENT)
Dept: CARDIOLOGY | Facility: CLINIC | Age: 43
End: 2018-10-29

## 2018-10-29 DIAGNOSIS — R00.2 HEART PALPITATIONS: Primary | ICD-10-CM

## 2018-10-29 PROCEDURE — 93299 PR REM INTERROG ICPMS/SCRMS <30 D TECH REVIEW: CPT | Performed by: INTERNAL MEDICINE

## 2018-10-29 PROCEDURE — 93298 REM INTERROG DEV EVAL SCRMS: CPT | Performed by: INTERNAL MEDICINE

## 2018-11-29 ENCOUNTER — TREATMENT (OUTPATIENT)
Dept: CARDIOLOGY | Facility: CLINIC | Age: 43
End: 2018-11-29

## 2018-11-29 DIAGNOSIS — R00.2 HEART PALPITATIONS: Primary | ICD-10-CM

## 2018-11-29 PROCEDURE — 93299 PR REM INTERROG ICPMS/SCRMS <30 D TECH REVIEW: CPT | Performed by: INTERNAL MEDICINE

## 2018-11-29 PROCEDURE — 93298 REM INTERROG DEV EVAL SCRMS: CPT | Performed by: INTERNAL MEDICINE

## 2018-12-28 ENCOUNTER — TREATMENT (OUTPATIENT)
Dept: CARDIOLOGY | Facility: CLINIC | Age: 43
End: 2018-12-28

## 2018-12-28 DIAGNOSIS — R00.2 HEART PALPITATIONS: Primary | ICD-10-CM

## 2019-01-03 PROCEDURE — 93299 PR REM INTERROG ICPMS/SCRMS <30 D TECH REVIEW: CPT | Performed by: INTERNAL MEDICINE

## 2019-01-03 PROCEDURE — 93298 REM INTERROG DEV EVAL SCRMS: CPT | Performed by: INTERNAL MEDICINE

## 2019-01-31 ENCOUNTER — TELEPHONE (OUTPATIENT)
Dept: CARDIOLOGY | Facility: CLINIC | Age: 44
End: 2019-01-31

## 2019-02-04 NOTE — TELEPHONE ENCOUNTER
Pt called me back and advised me that she will check it when she got home and call me back.   Pt called and stated that she got her monitor plugged back up. I checked on [facility] and I didn't receive a reading on her. I printed it off and placed it on 's desk.

## 2019-02-07 ENCOUNTER — OFFICE VISIT (OUTPATIENT)
Dept: CARDIOLOGY | Facility: CLINIC | Age: 44
End: 2019-02-07

## 2019-02-07 VITALS
HEIGHT: 64 IN | BODY MASS INDEX: 41.32 KG/M2 | SYSTOLIC BLOOD PRESSURE: 113 MMHG | WEIGHT: 242 LBS | RESPIRATION RATE: 16 BRPM | HEART RATE: 91 BPM | DIASTOLIC BLOOD PRESSURE: 72 MMHG

## 2019-02-07 DIAGNOSIS — R07.2 PRECORDIAL PAIN: ICD-10-CM

## 2019-02-07 DIAGNOSIS — R06.02 SHORTNESS OF BREATH: ICD-10-CM

## 2019-02-07 DIAGNOSIS — R00.2 PALPITATIONS: Primary | ICD-10-CM

## 2019-02-07 PROCEDURE — 99214 OFFICE O/P EST MOD 30 MIN: CPT | Performed by: NURSE PRACTITIONER

## 2019-02-07 PROCEDURE — 93000 ELECTROCARDIOGRAM COMPLETE: CPT | Performed by: NURSE PRACTITIONER

## 2019-02-07 RX ORDER — CLOCORTOLONE PIVALATE 0 G/G
1 CREAM TOPICAL 2 TIMES DAILY PRN
COMMUNITY

## 2019-02-07 RX ORDER — IVERMECTIN 10 MG/G
CREAM TOPICAL TAKE AS DIRECTED
COMMUNITY
End: 2019-12-17

## 2019-02-07 RX ORDER — MEDROXYPROGESTERONE ACETATE 10 MG/1
10 TABLET ORAL DAILY
COMMUNITY
End: 2019-12-17

## 2019-02-07 RX ORDER — NAPROXEN 500 MG/1
500 TABLET ORAL 2 TIMES DAILY WITH MEALS
COMMUNITY
End: 2019-12-17

## 2019-02-07 RX ORDER — TRIAMCINOLONE ACETONIDE 1 MG/G
1 CREAM TOPICAL 2 TIMES DAILY PRN
COMMUNITY

## 2019-02-07 RX ORDER — TRIAMCINOLONE ACETONIDE OINTMENT USP, 0.05% 0.5 MG/G
1 OINTMENT TOPICAL DAILY PRN
COMMUNITY
End: 2019-12-17

## 2019-02-07 NOTE — PROGRESS NOTES
Rajesh Palmer MD  Anna Andres  1975 02/07/2019    Patient Active Problem List   Diagnosis   • Gougerout-Sjoegren syndrome (CMS/HCC)   • Breath shortness   • Acute bronchitis   • Heart palpitations   • Dizziness   • Near syncope       Dear Rajesh Palmer MD:    Subjective     Chief Complaint   Patient presents with   • Follow-up     1+ year   • Palpitations     loop recorder, implant 11/17/2015   • Chest Pain     vague increased stress at the time   • Edema     LE           History of Present Illness:    Anna Andres is a 43 y.o. female with a history of          Allergies   Allergen Reactions   • Bupropion Hcl    • Celecoxib    • Meloxicam    • Metaxalone    • Moxifloxacin Hcl In Nacl    :      Current Outpatient Medications:   •  albuterol (PROVENTIL HFA;VENTOLIN HFA) 108 (90 BASE) MCG/ACT inhaler, Inhale 2 puffs every 4 (four) hours as needed for wheezing., Disp: , Rfl:   •  azelastine (ASTELIN) 0.1 % nasal spray, 2 sprays into each nostril 2 (two) times a day. Use in each nostril as directed, Disp: , Rfl:   •  Betamethasone Dipropionate (SERNIVO) 0.05 % emulsion, Apply  topically., Disp: , Rfl:   •  Clocortolone Pivalate 0.1 % cream, Apply  topically to the appropriate area as directed 2 (Two) Times a Day., Disp: , Rfl:   •  cyanocobalamin 1000 MCG/ML injection, monthly, Disp: , Rfl: 3  •  diclofenac (VOLTAREN) 3 % gel gel, Apply 4 g topically. for 60 days., Disp: , Rfl:   •  DULoxetine (CYMBALTA) 60 MG capsule, TK ONE C PO  QD, Disp: , Rfl: 1  •  fluticasone (FLONASE) 50 MCG/ACT nasal spray, 2 sprays into each nostril daily. Administer 2 sprays in each nostril for each dose., Disp: , Rfl:   •  hydroxychloroquine (PLAQUENIL) 200 MG tablet, TK 1 T PO BID, Disp: , Rfl: 1  •  Ibuprofen-Famotidine (DUEXIS) 800-26.6 MG tablet, Take  by mouth 3 (three) times a day. prn, Disp: , Rfl:   •  Ivermectin 1 % cream, Apply  topically Take As Directed., Disp: , Rfl:   •  Loratadine 10 MG capsule, Take   "by mouth., Disp: , Rfl:   •  medroxyPROGESTERone (PROVERA) 10 MG tablet, Take 10 mg by mouth Daily. x5 days per month, Disp: , Rfl:   •  montelukast (SINGULAIR) 10 MG tablet, TK 1 T PO  QPM, Disp: , Rfl: 3  •  Multiple Vitamin (MULTI VITAMIN DAILY PO), Take  by mouth., Disp: , Rfl:   •  naproxen (NAPROSYN) 500 MG tablet, Take 500 mg by mouth 2 (Two) Times a Day With Meals., Disp: , Rfl:   •  naratriptan (AMERGE) 2.5 MG tablet, , Disp: , Rfl: 5  •  O2 (OXYGEN), Inhale 2 L/min every night., Disp: , Rfl:   •  Omega-3 Fatty Acids (FISH OIL) 1000 MG capsule capsule, Take 1,000 mg by mouth., Disp: , Rfl:   •  Oxymetazoline HCl (RHOFADE) 1 % cream, Apply  topically As Needed., Disp: , Rfl:   •  pantoprazole (PROTONIX) 40 MG EC tablet, TK 1 T PO QD, Disp: , Rfl: 3  •  PULMICORT FLEXHALER 180 MCG/ACT inhaler, USE 1 PUFF PO BID UTD, Disp: , Rfl: 3  •  RESTASIS 0.05 % ophthalmic emulsion, INT 1 GTT IN OU BID, Disp: , Rfl: 3  •  triamcinolone (KENALOG) 0.1 % cream, Apply  topically to the appropriate area as directed 2 (Two) Times a Day., Disp: , Rfl:   •  Triamcinolone Acetonide (TRIANEX) 0.05 % ointment, Apply  topically., Disp: , Rfl:   •  vitamin D (ERGOCALCIFEROL) 42737 UNITS capsule capsule, TK ONE C PO  Q WEEK, Disp: , Rfl: 4      The following portions of the patient's history were reviewed and updated as appropriate: allergies, current medications, past family history, past medical history, past social history, past surgical history and problem list.    Social History     Tobacco Use   • Smoking status: Former Smoker     Packs/day: 0.50     Types: Cigarettes     Last attempt to quit: 2011     Years since quittin.1   • Smokeless tobacco: Never Used   Substance Use Topics   • Alcohol use: No   • Drug use: No       ROS    Objective   Vitals:    02/07/19 1548   BP: 113/72   Pulse: 91   Resp: 16   Weight: 110 kg (242 lb)   Height: 162.6 cm (64\")     Body mass index is 41.54 kg/m².        Physical Exam    Lab Results "   Component Value Date     07/11/2018    K 3.9 07/11/2018     07/11/2018    CO2 29.3 07/11/2018    BUN 14 07/11/2018    CREATININE 0.81 07/11/2018    GLUCOSE 85 07/11/2018    CALCIUM 9.5 07/11/2018    AST 29 07/11/2018    ALT 41 (H) 07/11/2018    ALKPHOS 54 07/11/2018    LABIL2 1.4 (L) 06/11/2015     Lab Results   Component Value Date    CKTOTAL 172 12/01/2016     Lab Results   Component Value Date    WBC 6.19 07/11/2018    HGB 14.4 07/11/2018    HCT 42.5 07/11/2018     07/11/2018     Lab Results   Component Value Date    INR <0.90 05/13/2014     Lab Results   Component Value Date    MG 2.0 12/01/2016     Lab Results   Component Value Date    TSH 2.901 07/11/2018    CHLPL 222 (H) 06/11/2015    TRIG 65 03/10/2018    HDL 64 03/10/2018     (H) 03/10/2018      Lab Results   Component Value Date    BNP 16.0 12/01/2016           Procedures      Assessment/Plan   No diagnosis found.             Recommendations:              Patient's Body mass index is 41.54 kg/m². BMI is {BMI range:29864}.         No Follow-up on file.    As always, I appreciate very much the opportunity to participate in the cardiovascular care of your patients.      With Best Regards,    CORTES Chavez

## 2019-02-07 NOTE — PROGRESS NOTES
Rajesh Palmer MD  Anna Urbanen  1975 02/07/2019    Patient Active Problem List   Diagnosis   • Gougerout-Sjoegren syndrome (CMS/HCC)   • Breath shortness   • Acute bronchitis   • Heart palpitations   • Dizziness   • Near syncope       Dear Rajesh Palmer MD:    Subjective     Anna Andres is a 43 y.o. female with the problems as listed above, presents    Chief Complaint   Patient presents with   • Follow-up     1+ year   • Palpitations     loop recorder, implant 11/17/2015   • Chest Pain     vague increased stress at the time   • Edema     LE       History of Present Illness:        Allergies   Allergen Reactions   • Bupropion Hcl    • Celecoxib    • Meloxicam    • Metaxalone    • Moxifloxacin Hcl In Nacl    :      Current Outpatient Medications:   •  albuterol (PROVENTIL HFA;VENTOLIN HFA) 108 (90 BASE) MCG/ACT inhaler, Inhale 2 puffs every 4 (four) hours as needed for wheezing., Disp: , Rfl:   •  azelastine (ASTELIN) 0.1 % nasal spray, 2 sprays into each nostril 2 (two) times a day. Use in each nostril as directed, Disp: , Rfl:   •  Betamethasone Dipropionate (SERNIVO) 0.05 % emulsion, Apply  topically., Disp: , Rfl:   •  Clocortolone Pivalate 0.1 % cream, Apply  topically to the appropriate area as directed 2 (Two) Times a Day., Disp: , Rfl:   •  cyanocobalamin 1000 MCG/ML injection, monthly, Disp: , Rfl: 3  •  diclofenac (VOLTAREN) 3 % gel gel, Apply 4 g topically. for 60 days., Disp: , Rfl:   •  DULoxetine (CYMBALTA) 60 MG capsule, TK ONE C PO  QD, Disp: , Rfl: 1  •  fluticasone (FLONASE) 50 MCG/ACT nasal spray, 2 sprays into each nostril daily. Administer 2 sprays in each nostril for each dose., Disp: , Rfl:   •  hydroxychloroquine (PLAQUENIL) 200 MG tablet, TK 1 T PO BID, Disp: , Rfl: 1  •  Ibuprofen-Famotidine (DUEXIS) 800-26.6 MG tablet, Take  by mouth 3 (three) times a day. prn, Disp: , Rfl:   •  Ivermectin 1 % cream, Apply  topically Take As Directed., Disp: , Rfl:   •  Loratadine  10 MG capsule, Take  by mouth., Disp: , Rfl:   •  medroxyPROGESTERone (PROVERA) 10 MG tablet, Take 10 mg by mouth Daily. x5 days per month, Disp: , Rfl:   •  montelukast (SINGULAIR) 10 MG tablet, TK 1 T PO  QPM, Disp: , Rfl: 3  •  Multiple Vitamin (MULTI VITAMIN DAILY PO), Take  by mouth., Disp: , Rfl:   •  naproxen (NAPROSYN) 500 MG tablet, Take 500 mg by mouth 2 (Two) Times a Day With Meals., Disp: , Rfl:   •  naratriptan (AMERGE) 2.5 MG tablet, , Disp: , Rfl: 5  •  O2 (OXYGEN), Inhale 2 L/min every night., Disp: , Rfl:   •  Omega-3 Fatty Acids (FISH OIL) 1000 MG capsule capsule, Take 1,000 mg by mouth., Disp: , Rfl:   •  Oxymetazoline HCl (RHOFADE) 1 % cream, Apply  topically As Needed., Disp: , Rfl:   •  pantoprazole (PROTONIX) 40 MG EC tablet, TK 1 T PO QD, Disp: , Rfl: 3  •  PULMICORT FLEXHALER 180 MCG/ACT inhaler, USE 1 PUFF PO BID UTD, Disp: , Rfl: 3  •  RESTASIS 0.05 % ophthalmic emulsion, INT 1 GTT IN OU BID, Disp: , Rfl: 3  •  triamcinolone (KENALOG) 0.1 % cream, Apply  topically to the appropriate area as directed 2 (Two) Times a Day., Disp: , Rfl:   •  Triamcinolone Acetonide (TRIANEX) 0.05 % ointment, Apply  topically., Disp: , Rfl:   •  vitamin D (ERGOCALCIFEROL) 64405 UNITS capsule capsule, TK ONE C PO  Q WEEK, Disp: , Rfl: 4      The following portions of the patient's history were reviewed and updated as appropriate: allergies, current medications, past family history, past medical history, past social history, past surgical history and problem list.    Social History     Tobacco Use   • Smoking status: Former Smoker     Packs/day: 0.50     Types: Cigarettes     Last attempt to quit: 2011     Years since quittin.1   • Smokeless tobacco: Never Used   Substance Use Topics   • Alcohol use: No   • Drug use: No       Review of Systems   Cardiovascular: Positive for chest pain, leg swelling and palpitations.   Respiratory: Positive for shortness of breath.        Objective   Vitals:    19 1548   "  BP: 113/72   Pulse: 91   Resp: 16   Weight: 110 kg (242 lb)   Height: 162.6 cm (64\")     Body mass index is 41.54 kg/m².        Physical Exam    Lab Results   Component Value Date     07/11/2018    K 3.9 07/11/2018     07/11/2018    CO2 29.3 07/11/2018    BUN 14 07/11/2018    CREATININE 0.81 07/11/2018    GLUCOSE 85 07/11/2018    CALCIUM 9.5 07/11/2018    AST 29 07/11/2018    ALT 41 (H) 07/11/2018    ALKPHOS 54 07/11/2018    LABIL2 1.4 (L) 06/11/2015     Lab Results   Component Value Date    CKTOTAL 172 12/01/2016     Lab Results   Component Value Date    WBC 6.19 07/11/2018    HGB 14.4 07/11/2018    HCT 42.5 07/11/2018     07/11/2018     Lab Results   Component Value Date    INR <0.90 05/13/2014     Lab Results   Component Value Date    MG 2.0 12/01/2016     Lab Results   Component Value Date    TSH 2.901 07/11/2018    CHLPL 222 (H) 06/11/2015    TRIG 65 03/10/2018    HDL 64 03/10/2018     (H) 03/10/2018      Lab Results   Component Value Date    BNP 16.0 12/01/2016       During this visit the following were done:  Labs Reviewed [x]    Labs Ordered []    Radiology Reports Reviewed [x]    Radiology Ordered []    PCP Records Reviewed []    Referring Provider Records Reviewed []    ER Records Reviewed [x]    Hospital Records Reviewed [x]    History Obtained From Family []    Radiology Images Reviewed [x]    Other Reviewed [x]    Records Requested []      Procedures      Assessment/Plan   No diagnosis found.             Recommendations:       No Follow-up on file.    As always, Rajesh Palmer MD  I appreciate very much the opportunity to participate in the cardiovascular care of your patients. Please do not hesitate to call me with any questions with regards to Anna MICKIE Andres evaluation and management.           With Best Regards,        Milton Trotter MD, [facility]    Dragon disclaimer:  Much of this encounter note is an electronic transcription/translation of spoken language to printed " text. The electronic translation of spoken language may permit erroneous, or at times, nonsensical words or phrases to be inadvertently transcribed; Although I have reviewed the note for such errors, some may still exist.

## 2019-02-07 NOTE — PROGRESS NOTES
Rajesh Palmer MD  Anna Andres  1975 02/07/2019    Patient Active Problem List   Diagnosis   • Gougerout-Sjoegren syndrome (CMS/HCC)   • Breath shortness   • Acute bronchitis   • Heart palpitations   • Dizziness   • Near syncope       Dear Rajesh Palmer MD:    Subjective     Chief Complaint   Patient presents with   • Follow-up     1+ year   • Palpitations     loop recorder, implant 11/17/2015   • Chest Pain     vague increased stress at the time   • Edema     LE           History of Present Illness:    Anna Andres is a 43 y.o. female with a history of dizziness and near syncope in the past.  A loop recorder was implanted in 2015.  She presents today for routine cardiology follow-up.  She has had no further episodes of dizziness or syncope.  She does have some chronic lower extremity edema which has not recently changed.  She reports a recent episode of left-sided chest pressure which radiated into the left arm and was associated with some left arm paresthesias.  She also had some associated diaphoresis.  She states this occurred once and she has had no further episodes of chest pain.  At the time of the pain she had recently lost her father and was having issues with her son.  She reports she was feeling very stressed.  Since that time, she has had no further chest pains.  She is a nonsmoker.  She is nondiabetic.  She does have a family history of premature coronary artery disease.          Allergies   Allergen Reactions   • Bupropion Hcl    • Celecoxib    • Meloxicam    • Metaxalone    • Moxifloxacin Hcl In Nacl    :      Current Outpatient Medications:   •  albuterol (PROVENTIL HFA;VENTOLIN HFA) 108 (90 BASE) MCG/ACT inhaler, Inhale 2 puffs every 4 (four) hours as needed for wheezing., Disp: , Rfl:   •  azelastine (ASTELIN) 0.1 % nasal spray, 2 sprays into each nostril 2 (two) times a day. Use in each nostril as directed, Disp: , Rfl:   •  Betamethasone Dipropionate (SERNIVO) 0.05 %  emulsion, Apply  topically., Disp: , Rfl:   •  Clocortolone Pivalate 0.1 % cream, Apply  topically to the appropriate area as directed 2 (Two) Times a Day., Disp: , Rfl:   •  cyanocobalamin 1000 MCG/ML injection, monthly, Disp: , Rfl: 3  •  diclofenac (VOLTAREN) 3 % gel gel, Apply 4 g topically. for 60 days., Disp: , Rfl:   •  DULoxetine (CYMBALTA) 60 MG capsule, TK ONE C PO  QD, Disp: , Rfl: 1  •  fluticasone (FLONASE) 50 MCG/ACT nasal spray, 2 sprays into each nostril daily. Administer 2 sprays in each nostril for each dose., Disp: , Rfl:   •  hydroxychloroquine (PLAQUENIL) 200 MG tablet, TK 1 T PO BID, Disp: , Rfl: 1  •  Ibuprofen-Famotidine (DUEXIS) 800-26.6 MG tablet, Take  by mouth 3 (three) times a day. prn, Disp: , Rfl:   •  Ivermectin 1 % cream, Apply  topically Take As Directed., Disp: , Rfl:   •  Loratadine 10 MG capsule, Take  by mouth., Disp: , Rfl:   •  medroxyPROGESTERone (PROVERA) 10 MG tablet, Take 10 mg by mouth Daily. x5 days per month, Disp: , Rfl:   •  montelukast (SINGULAIR) 10 MG tablet, TK 1 T PO  QPM, Disp: , Rfl: 3  •  Multiple Vitamin (MULTI VITAMIN DAILY PO), Take  by mouth., Disp: , Rfl:   •  naproxen (NAPROSYN) 500 MG tablet, Take 500 mg by mouth 2 (Two) Times a Day With Meals., Disp: , Rfl:   •  naratriptan (AMERGE) 2.5 MG tablet, , Disp: , Rfl: 5  •  O2 (OXYGEN), Inhale 2 L/min every night., Disp: , Rfl:   •  Omega-3 Fatty Acids (FISH OIL) 1000 MG capsule capsule, Take 1,000 mg by mouth., Disp: , Rfl:   •  Oxymetazoline HCl (RHOFADE) 1 % cream, Apply  topically As Needed., Disp: , Rfl:   •  pantoprazole (PROTONIX) 40 MG EC tablet, TK 1 T PO QD, Disp: , Rfl: 3  •  PULMICORT FLEXHALER 180 MCG/ACT inhaler, USE 1 PUFF PO BID UTD, Disp: , Rfl: 3  •  RESTASIS 0.05 % ophthalmic emulsion, INT 1 GTT IN OU BID, Disp: , Rfl: 3  •  triamcinolone (KENALOG) 0.1 % cream, Apply  topically to the appropriate area as directed 2 (Two) Times a Day., Disp: , Rfl:   •  Triamcinolone Acetonide (TRIANEX)  "0.05 % ointment, Apply  topically., Disp: , Rfl:   •  vitamin D (ERGOCALCIFEROL) 50562 UNITS capsule capsule, TK ONE C PO  Q WEEK, Disp: , Rfl: 4      The following portions of the patient's history were reviewed and updated as appropriate: allergies, current medications, past family history, past medical history, past social history, past surgical history and problem list.    Social History     Tobacco Use   • Smoking status: Former Smoker     Packs/day: 0.50     Types: Cigarettes     Last attempt to quit:      Years since quittin.1   • Smokeless tobacco: Never Used   Substance Use Topics   • Alcohol use: No   • Drug use: No       Review of Systems   Constitution: Negative for weakness and malaise/fatigue.   Cardiovascular: Positive for chest pain, leg swelling and palpitations. Negative for dyspnea on exertion and syncope.   Respiratory: Positive for shortness of breath. Negative for cough and wheezing.    Neurological: Negative for dizziness and light-headedness.       Objective   Vitals:    19 1548   BP: 113/72   Pulse: 91   Resp: 16   Weight: 110 kg (242 lb)   Height: 162.6 cm (64\")     Body mass index is 41.54 kg/m².        Physical Exam   Constitutional: She is oriented to person, place, and time. She appears well-developed and well-nourished.   HENT:   Head: Normocephalic and atraumatic.   Neck: No JVD present.   Cardiovascular: Normal rate, regular rhythm and normal heart sounds. Exam reveals no S3 and no S4.   No murmur heard.  Pulmonary/Chest: Effort normal and breath sounds normal. She has no wheezes. She has no rales.   Abdominal: Soft. Bowel sounds are normal.   Musculoskeletal: She exhibits no edema.   Neurological: She is alert and oriented to person, place, and time.   Skin: Skin is warm and dry.   Psychiatric: She has a normal mood and affect. Her behavior is normal.       Lab Results   Component Value Date     2018    K 3.9 2018     2018    CO2 29.3 " 07/11/2018    BUN 14 07/11/2018    CREATININE 0.81 07/11/2018    GLUCOSE 85 07/11/2018    CALCIUM 9.5 07/11/2018    AST 29 07/11/2018    ALT 41 (H) 07/11/2018    ALKPHOS 54 07/11/2018    LABIL2 1.4 (L) 06/11/2015     Lab Results   Component Value Date    CKTOTAL 172 12/01/2016     Lab Results   Component Value Date    WBC 6.19 07/11/2018    HGB 14.4 07/11/2018    HCT 42.5 07/11/2018     07/11/2018     Lab Results   Component Value Date    INR <0.90 05/13/2014     Lab Results   Component Value Date    MG 2.0 12/01/2016     Lab Results   Component Value Date    TSH 2.901 07/11/2018    CHLPL 222 (H) 06/11/2015    TRIG 65 03/10/2018    HDL 64 03/10/2018     (H) 03/10/2018      Lab Results   Component Value Date    BNP 16.0 12/01/2016             ECG 12 Lead  Date/Time: 2/7/2019 4:08 PM  Performed by: Candy Delgado APRN  Authorized by: Candy Delgado APRN   Comparison: compared with previous ECG   Similar to previous ECG  Rhythm: sinus rhythm  BPM: 87                Assessment/Plan    Diagnosis Plan   1. Palpitations     2. Precordial pain  Treadmill Stress Test    Adult Transthoracic Echo Complete W/ Cont if Necessary Per Protocol   3. Shortness of breath  Treadmill Stress Test    Adult Transthoracic Echo Complete W/ Cont if Necessary Per Protocol                Recommendations:    1. Will evaluate her chest pains further with an echocardiogram and treadmill stress test.    2. Will consider loop recorder extraction when the battery life is depleted    3. Follow up in 2 months and PRN      Return in about 2 months (around 4/7/2019) for Recheck.    As always, I appreciate very much the opportunity to participate in the cardiovascular care of your patients.      With Best Regards,    CORTES Chavez

## 2019-02-15 ENCOUNTER — HOSPITAL ENCOUNTER (OUTPATIENT)
Dept: CARDIOLOGY | Facility: HOSPITAL | Age: 44
Discharge: HOME | End: 2019-02-15
Admitting: NURSE PRACTITIONER

## 2019-02-15 ENCOUNTER — HOSPITAL ENCOUNTER (OUTPATIENT)
Dept: CARDIOLOGY | Facility: HOSPITAL | Age: 44
Discharge: HOME | End: 2019-02-15

## 2019-02-15 DIAGNOSIS — R06.02 SHORTNESS OF BREATH: ICD-10-CM

## 2019-02-15 DIAGNOSIS — R07.2 PRECORDIAL PAIN: ICD-10-CM

## 2019-02-15 PROCEDURE — 93306 TTE W/DOPPLER COMPLETE: CPT | Performed by: INTERNAL MEDICINE

## 2019-02-15 PROCEDURE — 93017 CV STRESS TEST TRACING ONLY: CPT

## 2019-02-15 PROCEDURE — 93018 CV STRESS TEST I&R ONLY: CPT | Performed by: INTERNAL MEDICINE

## 2019-02-15 PROCEDURE — 93306 TTE W/DOPPLER COMPLETE: CPT

## 2019-02-16 LAB
BH CV STRESS BP STAGE 1: NORMAL
BH CV STRESS BP STAGE 2: NORMAL
BH CV STRESS BP STAGE 3: NORMAL
BH CV STRESS DURATION MIN STAGE 1: 3
BH CV STRESS DURATION MIN STAGE 2: 3
BH CV STRESS DURATION MIN STAGE 3: 3
BH CV STRESS DURATION SEC STAGE 1: 0
BH CV STRESS DURATION SEC STAGE 2: 0
BH CV STRESS DURATION SEC STAGE 3: 0
BH CV STRESS GRADE STAGE 1: 10
BH CV STRESS GRADE STAGE 2: 12
BH CV STRESS GRADE STAGE 3: 14
BH CV STRESS HR STAGE 1: 118
BH CV STRESS HR STAGE 2: 133
BH CV STRESS HR STAGE 3: 154
BH CV STRESS METS STAGE 1: 5
BH CV STRESS METS STAGE 2: 7.5
BH CV STRESS METS STAGE 3: 10
BH CV STRESS PROTOCOL 1: NORMAL
BH CV STRESS RECOVERY BP: NORMAL MMHG
BH CV STRESS RECOVERY HR: 89 BPM
BH CV STRESS SPEED STAGE 1: 1.7
BH CV STRESS SPEED STAGE 2: 2.5
BH CV STRESS SPEED STAGE 3: 3.4
BH CV STRESS STAGE 1: 1
BH CV STRESS STAGE 2: 2
BH CV STRESS STAGE 3: 3
MAXIMAL PREDICTED HEART RATE: 177 BPM
PERCENT MAX PREDICTED HR: 87.01 %
STRESS BASELINE BP: NORMAL MMHG
STRESS BASELINE HR: 81 BPM
STRESS PERCENT HR: 102 %
STRESS POST ESTIMATED WORKLOAD: 9.6 METS
STRESS POST EXERCISE DUR MIN: 8 MIN
STRESS POST EXERCISE DUR SEC: 0 SEC
STRESS POST PEAK BP: NORMAL MMHG
STRESS POST PEAK HR: 154 BPM
STRESS TARGET HR: 150 BPM

## 2019-02-17 LAB
BH CV ECHO MEAS - % IVS THICK: 25.4 %
BH CV ECHO MEAS - % LVPW THICK: 28.3 %
BH CV ECHO MEAS - ACS: 1.7 CM
BH CV ECHO MEAS - AO MAX PG: 7.5 MMHG
BH CV ECHO MEAS - AO MEAN PG: 4.7 MMHG
BH CV ECHO MEAS - AO ROOT AREA (BSA CORRECTED): 1.2
BH CV ECHO MEAS - AO ROOT AREA: 5 CM^2
BH CV ECHO MEAS - AO ROOT DIAM: 2.5 CM
BH CV ECHO MEAS - AO V2 MAX: 137.2 CM/SEC
BH CV ECHO MEAS - AO V2 MEAN: 102.8 CM/SEC
BH CV ECHO MEAS - AO V2 VTI: 30.8 CM
BH CV ECHO MEAS - BSA(HAYCOCK): 2.3 M^2
BH CV ECHO MEAS - BSA: 2.1 M^2
BH CV ECHO MEAS - BZI_BMI: 41.5 KILOGRAMS/M^2
BH CV ECHO MEAS - BZI_METRIC_HEIGHT: 162.6 CM
BH CV ECHO MEAS - BZI_METRIC_WEIGHT: 109.8 KG
BH CV ECHO MEAS - EDV(CUBED): 107.8 ML
BH CV ECHO MEAS - EDV(MOD-SP4): 78 ML
BH CV ECHO MEAS - EDV(TEICH): 105.4 ML
BH CV ECHO MEAS - EF(CUBED): 83 %
BH CV ECHO MEAS - EF(MOD-SP4): 69.2 %
BH CV ECHO MEAS - EF(TEICH): 75.8 %
BH CV ECHO MEAS - ESV(CUBED): 18.3 ML
BH CV ECHO MEAS - ESV(MOD-SP4): 24 ML
BH CV ECHO MEAS - ESV(TEICH): 25.5 ML
BH CV ECHO MEAS - FS: 44.6 %
BH CV ECHO MEAS - IVS/LVPW: 0.94
BH CV ECHO MEAS - IVSD: 1.1 CM
BH CV ECHO MEAS - IVSS: 1.4 CM
BH CV ECHO MEAS - LA DIMENSION: 3.7 CM
BH CV ECHO MEAS - LA/AO: 1.5
BH CV ECHO MEAS - LV DIASTOLIC VOL/BSA (35-75): 36.8 ML/M^2
BH CV ECHO MEAS - LV MASS(C)D: 198.2 GRAMS
BH CV ECHO MEAS - LV MASS(C)DI: 93.4 GRAMS/M^2
BH CV ECHO MEAS - LV MASS(C)S: 123.8 GRAMS
BH CV ECHO MEAS - LV MASS(C)SI: 58.4 GRAMS/M^2
BH CV ECHO MEAS - LV SYSTOLIC VOL/BSA (12-30): 11.3 ML/M^2
BH CV ECHO MEAS - LVIDD: 4.8 CM
BH CV ECHO MEAS - LVIDS: 2.6 CM
BH CV ECHO MEAS - LVLD AP4: 8.2 CM
BH CV ECHO MEAS - LVLS AP4: 6.9 CM
BH CV ECHO MEAS - LVOT AREA (M): 2.5 CM^2
BH CV ECHO MEAS - LVOT AREA: 2.7 CM^2
BH CV ECHO MEAS - LVOT DIAM: 1.8 CM
BH CV ECHO MEAS - LVPWD: 1.2 CM
BH CV ECHO MEAS - LVPWS: 1.5 CM
BH CV ECHO MEAS - MV A MAX VEL: 84.9 CM/SEC
BH CV ECHO MEAS - MV E MAX VEL: 80.9 CM/SEC
BH CV ECHO MEAS - MV E/A: 0.95
BH CV ECHO MEAS - PA ACC SLOPE: 1132 CM/SEC^2
BH CV ECHO MEAS - PA ACC TIME: 0.1 SEC
BH CV ECHO MEAS - PA PR(ACCEL): 34.6 MMHG
BH CV ECHO MEAS - RAP SYSTOLE: 10 MMHG
BH CV ECHO MEAS - RVSP: 31 MMHG
BH CV ECHO MEAS - SI(AO): 72.3 ML/M^2
BH CV ECHO MEAS - SI(CUBED): 42.2 ML/M^2
BH CV ECHO MEAS - SI(MOD-SP4): 25.5 ML/M^2
BH CV ECHO MEAS - SI(TEICH): 37.7 ML/M^2
BH CV ECHO MEAS - SV(AO): 153.4 ML
BH CV ECHO MEAS - SV(CUBED): 89.5 ML
BH CV ECHO MEAS - SV(MOD-SP4): 54 ML
BH CV ECHO MEAS - SV(TEICH): 79.9 ML
BH CV ECHO MEAS - TR MAX VEL: 228.9 CM/SEC

## 2019-04-11 ENCOUNTER — OFFICE VISIT (OUTPATIENT)
Dept: CARDIOLOGY | Facility: CLINIC | Age: 44
End: 2019-04-11

## 2019-04-11 VITALS
OXYGEN SATURATION: 97 % | BODY MASS INDEX: 40.77 KG/M2 | DIASTOLIC BLOOD PRESSURE: 80 MMHG | WEIGHT: 238.8 LBS | HEART RATE: 80 BPM | HEIGHT: 64 IN | SYSTOLIC BLOOD PRESSURE: 118 MMHG

## 2019-04-11 DIAGNOSIS — R00.2 HEART PALPITATIONS: Primary | ICD-10-CM

## 2019-04-11 DIAGNOSIS — M35.00 SJOGREN'S SYNDROME, WITH UNSPECIFIED ORGAN INVOLVEMENT (HCC): ICD-10-CM

## 2019-04-11 PROCEDURE — 99213 OFFICE O/P EST LOW 20 MIN: CPT | Performed by: NURSE PRACTITIONER

## 2019-04-11 NOTE — PROGRESS NOTES
Rajesh Palmer MD  Anna Andres  1975 04/11/2019    Patient Active Problem List   Diagnosis   • Gougerout-Sjoegren syndrome (CMS/HCC)   • Breath shortness   • Acute bronchitis   • Heart palpitations   • Dizziness   • Near syncope       Dear Rajesh Palmer MD:    Subjective     Chief Complaint   Patient presents with   • Follow-up   • Med Management     med list.    • Results     Echo and stress results.        History of Present Illness:    Anna Andres is a 43 y.o. female with a history of dizziness and near syncope in the past.  She has a loop recorder that was implanted in 2015.  She presents today for routine cardiology follow-up.  She reports that she has been feeling well.  She has had no episodes of dizziness or syncope since her previous visit.  She denies any chest pain, palpitations or shortness of breath.  She had an echocardiogram on 2/15/19 that revealed an EF of 61-65%, mild aortic valve regurgitation and mild tricuspid valve regurgitation.  She underwent a treadmill stress test on 2/15/19 where she reached 87% of her predicted heart rate with an exercise duration of 8 minutes and 9.6 METS.  There was no ST segment deviation or evidence of myocardial ischemia noted during the test.  She is a non-smoker and nondiabetic.  She follows with rheumatology and her PCP regularly.  Her blood pressure is normal at this visit.  She has no other concerns today.    Allergies   Allergen Reactions   • Bupropion Hcl    • Celecoxib    • Meloxicam    • Metaxalone    • Moxifloxacin Hcl In Nacl    :      Current Outpatient Medications:   •  albuterol (PROVENTIL HFA;VENTOLIN HFA) 108 (90 BASE) MCG/ACT inhaler, Inhale 2 puffs every 4 (four) hours as needed for wheezing., Disp: , Rfl:   •  azelastine (ASTELIN) 0.1 % nasal spray, 2 sprays into each nostril 2 (two) times a day. Use in each nostril as directed, Disp: , Rfl:   •  Betamethasone Dipropionate (SERNIVO) 0.05 % emulsion, Apply  topically.,  Disp: , Rfl:   •  Clocortolone Pivalate 0.1 % cream, Apply  topically to the appropriate area as directed 2 (Two) Times a Day., Disp: , Rfl:   •  cyanocobalamin 1000 MCG/ML injection, monthly, Disp: , Rfl: 3  •  diclofenac (VOLTAREN) 3 % gel gel, Apply 4 g topically. for 60 days., Disp: , Rfl:   •  DULoxetine (CYMBALTA) 60 MG capsule, TK ONE C PO  QD, Disp: , Rfl: 1  •  fluticasone (FLONASE) 50 MCG/ACT nasal spray, 2 sprays into each nostril daily. Administer 2 sprays in each nostril for each dose., Disp: , Rfl:   •  hydroxychloroquine (PLAQUENIL) 200 MG tablet, TK 1 T PO BID, Disp: , Rfl: 1  •  Ibuprofen-Famotidine (DUEXIS) 800-26.6 MG tablet, Take  by mouth 3 (three) times a day. prn, Disp: , Rfl:   •  Ivermectin 1 % cream, Apply  topically Take As Directed., Disp: , Rfl:   •  Lifitegrast (XIIDRA OP), Apply  to eye(s) as directed by provider., Disp: , Rfl:   •  Loratadine 10 MG capsule, Take  by mouth., Disp: , Rfl:   •  medroxyPROGESTERone (PROVERA) 10 MG tablet, Take 10 mg by mouth Daily. x5 days per month, Disp: , Rfl:   •  montelukast (SINGULAIR) 10 MG tablet, TK 1 T PO  QPM, Disp: , Rfl: 3  •  Multiple Vitamin (MULTI VITAMIN DAILY PO), Take  by mouth., Disp: , Rfl:   •  naproxen (NAPROSYN) 500 MG tablet, Take 500 mg by mouth 2 (Two) Times a Day With Meals., Disp: , Rfl:   •  naratriptan (AMERGE) 2.5 MG tablet, , Disp: , Rfl: 5  •  O2 (OXYGEN), Inhale 2 L/min every night., Disp: , Rfl:   •  Omega-3 Fatty Acids (FISH OIL) 1000 MG capsule capsule, Take 1,000 mg by mouth., Disp: , Rfl:   •  Oxymetazoline HCl (RHOFADE) 1 % cream, Apply  topically As Needed., Disp: , Rfl:   •  pantoprazole (PROTONIX) 40 MG EC tablet, TK 1 T PO QD, Disp: , Rfl: 3  •  PULMICORT FLEXHALER 180 MCG/ACT inhaler, USE 1 PUFF PO BID UTD, Disp: , Rfl: 3  •  triamcinolone (KENALOG) 0.1 % cream, Apply  topically to the appropriate area as directed 2 (Two) Times a Day., Disp: , Rfl:   •  Triamcinolone Acetonide (TRIANEX) 0.05 % ointment, Apply   "topically., Disp: , Rfl:   •  vitamin D (ERGOCALCIFEROL) 77009 UNITS capsule capsule, TK ONE C PO  Q WEEK, Disp: , Rfl: 4      The following portions of the patient's history were reviewed today and updated as appropriate: allergies, current medications, past family history, past medical history, past social history, past surgical history and problem list.    Social History     Tobacco Use   • Smoking status: Former Smoker     Packs/day: 0.50     Types: Cigarettes     Last attempt to quit: 2011     Years since quittin.2   • Smokeless tobacco: Never Used   Substance Use Topics   • Alcohol use: No   • Drug use: No       Review of Systems   Constitution: Negative for decreased appetite, weakness and malaise/fatigue.   Cardiovascular: Negative for chest pain, claudication, cyanosis, dyspnea on exertion, irregular heartbeat, leg swelling, near-syncope, orthopnea, palpitations, paroxysmal nocturnal dyspnea and syncope.   Respiratory: Negative for cough, shortness of breath and wheezing.    Hematologic/Lymphatic: Does not bruise/bleed easily.   Gastrointestinal: Negative for bloating and abdominal pain.   Genitourinary: Negative for bladder incontinence.   Neurological: Negative for dizziness, light-headedness and vertigo.   Psychiatric/Behavioral: Negative for altered mental status, depression and suicidal ideas.       Objective   Vitals:    19 0832   BP: 118/80   BP Location: Left arm   Patient Position: Sitting   Cuff Size: Adult   Pulse: 80   SpO2: 97%   Weight: 108 kg (238 lb 12.8 oz)   Height: 162.6 cm (64\")     Body mass index is 40.99 kg/m².    Physical Exam   Constitutional: She is oriented to person, place, and time. She appears well-developed and well-nourished.   HENT:   Head: Normocephalic.   Cardiovascular: Normal rate, regular rhythm, normal heart sounds and intact distal pulses. Exam reveals no S3, no S4 and no friction rub.   No murmur heard.  Pulmonary/Chest: Effort normal and breath sounds " normal. No respiratory distress. She has no wheezes.   Abdominal: Soft. Bowel sounds are normal. She exhibits no distension. There is no tenderness.   Musculoskeletal: She exhibits no edema.   Neurological: She is alert and oriented to person, place, and time.   Skin: Skin is warm and dry. No erythema.   Psychiatric: She has a normal mood and affect. Her behavior is normal.       Lab Results   Component Value Date     07/11/2018    K 3.9 07/11/2018     07/11/2018    CO2 29.3 07/11/2018    BUN 14 07/11/2018    CREATININE 0.81 07/11/2018    GLUCOSE 85 07/11/2018    CALCIUM 9.5 07/11/2018    AST 29 07/11/2018    ALT 41 (H) 07/11/2018    ALKPHOS 54 07/11/2018    LABIL2 1.4 (L) 06/11/2015     Lab Results   Component Value Date    CKTOTAL 172 12/01/2016     Lab Results   Component Value Date    WBC 6.19 07/11/2018    HGB 14.4 07/11/2018    HCT 42.5 07/11/2018     07/11/2018     Lab Results   Component Value Date    INR <0.90 05/13/2014     Lab Results   Component Value Date    MG 2.0 12/01/2016     Lab Results   Component Value Date    TSH 2.901 07/11/2018    CHLPL 222 (H) 06/11/2015    TRIG 65 03/10/2018    HDL 64 03/10/2018     (H) 03/10/2018      Lab Results   Component Value Date    BNP 16.0 12/01/2016       Assessment/Plan    Diagnosis Plan   1. Heart palpitations     2. Sjogren's syndrome, with unspecified organ involvement (CMS/[facility], located within [facility])              Recommendations:  1. Reviewed echocardiogram and treadmill stress test results with patient in detail.  2. Will contact patient about loop recorder battery status within the next few days when we receive the report. If at end of life, will set it up to have it removed.   3. Follow up in 6-7 months or sooner If needed      Return in about 6 months (around 10/11/2019) for Recheck.    As always, I appreciate very much the opportunity to participate in the cardiovascular care of your patients.      With Best Regards,          [place],  APRN

## 2019-05-03 ENCOUNTER — TREATMENT (OUTPATIENT)
Dept: CARDIOLOGY | Facility: CLINIC | Age: 44
End: 2019-05-03

## 2019-05-03 DIAGNOSIS — R00.2 HEART PALPITATIONS: Primary | ICD-10-CM

## 2019-05-03 PROCEDURE — 93299 PR REM INTERROG ICPMS/SCRMS <30 D TECH REVIEW: CPT | Performed by: INTERNAL MEDICINE

## 2019-05-03 PROCEDURE — 93298 REM INTERROG DEV EVAL SCRMS: CPT | Performed by: INTERNAL MEDICINE

## 2019-05-16 ENCOUNTER — APPOINTMENT (OUTPATIENT)
Dept: MAMMOGRAPHY | Facility: HOSPITAL | Age: 44
End: 2019-05-16

## 2019-06-03 ENCOUNTER — TREATMENT (OUTPATIENT)
Dept: CARDIOLOGY | Facility: CLINIC | Age: 44
End: 2019-06-03

## 2019-06-03 DIAGNOSIS — R00.2 PALPITATIONS: Primary | ICD-10-CM

## 2019-06-03 PROCEDURE — 93298 REM INTERROG DEV EVAL SCRMS: CPT | Performed by: INTERNAL MEDICINE

## 2019-06-03 PROCEDURE — 93299 PR REM INTERROG ICPMS/SCRMS <30 D TECH REVIEW: CPT | Performed by: INTERNAL MEDICINE

## 2019-10-17 ENCOUNTER — TELEPHONE (OUTPATIENT)
Dept: CARDIOLOGY | Facility: CLINIC | Age: 44
End: 2019-10-17

## 2019-10-17 NOTE — TELEPHONE ENCOUNTER
Anna called me back and I advised. She stated that when she plugs up her box it tells her that it can't connect. I advised her that I will put her name down for the next clinic to get her battery checked on her loop recorder. She expressed understanding.     Called pt to advise her that her loop recorder box has came unplugged. No answer LM.

## 2019-10-30 ENCOUNTER — TRANSCRIBE ORDERS (OUTPATIENT)
Dept: ADMINISTRATIVE | Facility: HOSPITAL | Age: 44
End: 2019-10-30

## 2019-10-30 ENCOUNTER — HOSPITAL ENCOUNTER (OUTPATIENT)
Dept: CARDIOLOGY | Facility: HOSPITAL | Age: 44
Discharge: HOME | End: 2019-10-30
Admitting: INTERNAL MEDICINE

## 2019-10-30 ENCOUNTER — TELEPHONE (OUTPATIENT)
Dept: CARDIOLOGY | Facility: CLINIC | Age: 44
End: 2019-10-30

## 2019-10-30 DIAGNOSIS — M79.605 LEFT LEG PAIN: Primary | ICD-10-CM

## 2019-10-30 DIAGNOSIS — M79.605 LEFT LEG PAIN: ICD-10-CM

## 2019-10-30 PROCEDURE — 93971 EXTREMITY STUDY: CPT | Performed by: RADIOLOGY

## 2019-10-30 PROCEDURE — 93971 EXTREMITY STUDY: CPT

## 2019-11-08 ENCOUNTER — CLINICAL SUPPORT (OUTPATIENT)
Dept: CARDIOLOGY | Facility: CLINIC | Age: 44
End: 2019-11-08

## 2019-11-08 DIAGNOSIS — R00.2 PALPITATIONS: Primary | ICD-10-CM

## 2019-11-09 PROCEDURE — 93291 INTERROG DEV EVAL SCRMS IP: CPT | Performed by: INTERNAL MEDICINE

## 2019-11-24 ENCOUNTER — PREP FOR SURGERY (OUTPATIENT)
Dept: OTHER | Facility: HOSPITAL | Age: 44
End: 2019-11-24

## 2019-11-24 DIAGNOSIS — D25.9 UTERINE LEIOMYOMA: Primary | ICD-10-CM

## 2019-11-24 DIAGNOSIS — N93.9 ABNORMAL UTERINE BLEEDING (AUB): ICD-10-CM

## 2019-11-24 RX ORDER — SODIUM CHLORIDE 0.9 % (FLUSH) 0.9 %
10 SYRINGE (ML) INJECTION AS NEEDED
Status: CANCELLED | OUTPATIENT
Start: 2019-11-24

## 2019-11-24 RX ORDER — SODIUM CHLORIDE 0.9 % (FLUSH) 0.9 %
3 SYRINGE (ML) INJECTION EVERY 12 HOURS SCHEDULED
Status: CANCELLED | OUTPATIENT
Start: 2019-11-24

## 2019-11-25 ENCOUNTER — APPOINTMENT (OUTPATIENT)
Dept: PREADMISSION TESTING | Facility: HOSPITAL | Age: 44
End: 2019-11-25

## 2019-12-10 ENCOUNTER — LAB (OUTPATIENT)
Dept: LAB | Facility: HOSPITAL | Age: 44
End: 2019-12-10

## 2019-12-10 ENCOUNTER — TRANSCRIBE ORDERS (OUTPATIENT)
Dept: ADMINISTRATIVE | Facility: HOSPITAL | Age: 44
End: 2019-12-10

## 2019-12-10 DIAGNOSIS — G47.33 OBSTRUCTIVE SLEEP APNEA (ADULT) (PEDIATRIC): ICD-10-CM

## 2019-12-10 DIAGNOSIS — J30.2 OTHER SEASONAL ALLERGIC RHINITIS: ICD-10-CM

## 2019-12-10 DIAGNOSIS — E55.9 VITAMIN D DEFICIENCY: ICD-10-CM

## 2019-12-10 DIAGNOSIS — E53.8 DEFICIENCY OF OTHER SPECIFIED B GROUP VITAMINS: Primary | ICD-10-CM

## 2019-12-10 DIAGNOSIS — M79.7 FIBROMYALGIA: ICD-10-CM

## 2019-12-10 DIAGNOSIS — E53.8 DEFICIENCY OF OTHER SPECIFIED B GROUP VITAMINS: ICD-10-CM

## 2019-12-10 LAB
25(OH)D3 SERPL-MCNC: 49.9 NG/ML (ref 30–100)
ALBUMIN SERPL-MCNC: 4 G/DL (ref 3.5–5.2)
ALBUMIN/GLOB SERPL: 1.4 G/DL
ALP SERPL-CCNC: 52 U/L (ref 39–117)
ALT SERPL W P-5'-P-CCNC: 24 U/L (ref 1–33)
ANION GAP SERPL CALCULATED.3IONS-SCNC: 11.5 MMOL/L (ref 5–15)
AST SERPL-CCNC: 19 U/L (ref 1–32)
BASOPHILS # BLD AUTO: 0.04 10*3/MM3 (ref 0–0.2)
BASOPHILS NFR BLD AUTO: 1 % (ref 0–1.5)
BILIRUB SERPL-MCNC: 0.5 MG/DL (ref 0.2–1.2)
BUN BLD-MCNC: 18 MG/DL (ref 6–20)
BUN/CREAT SERPL: 22.8 (ref 7–25)
CALCIUM SPEC-SCNC: 9.3 MG/DL (ref 8.6–10.5)
CHLORIDE SERPL-SCNC: 101 MMOL/L (ref 98–107)
CHOLEST SERPL-MCNC: 203 MG/DL (ref 0–200)
CO2 SERPL-SCNC: 26.5 MMOL/L (ref 22–29)
CREAT BLD-MCNC: 0.79 MG/DL (ref 0.57–1)
DEPRECATED RDW RBC AUTO: 42.2 FL (ref 37–54)
EOSINOPHIL # BLD AUTO: 0.15 10*3/MM3 (ref 0–0.4)
EOSINOPHIL NFR BLD AUTO: 3.8 % (ref 0.3–6.2)
ERYTHROCYTE [DISTWIDTH] IN BLOOD BY AUTOMATED COUNT: 12.2 % (ref 12.3–15.4)
FOLATE SERPL-MCNC: 16.5 NG/ML (ref 4.78–24.2)
GFR SERPL CREATININE-BSD FRML MDRD: 79 ML/MIN/1.73
GLOBULIN UR ELPH-MCNC: 2.9 GM/DL
GLUCOSE BLD-MCNC: 84 MG/DL (ref 65–99)
HCT VFR BLD AUTO: 40.5 % (ref 34–46.6)
HDLC SERPL-MCNC: 53 MG/DL (ref 40–60)
HGB BLD-MCNC: 13.2 G/DL (ref 12–15.9)
IMM GRANULOCYTES # BLD AUTO: 0.02 10*3/MM3 (ref 0–0.05)
IMM GRANULOCYTES NFR BLD AUTO: 0.5 % (ref 0–0.5)
LDLC SERPL CALC-MCNC: 134 MG/DL (ref 0–100)
LDLC/HDLC SERPL: 2.53 {RATIO}
LYMPHOCYTES # BLD AUTO: 1.48 10*3/MM3 (ref 0.7–3.1)
LYMPHOCYTES NFR BLD AUTO: 37.3 % (ref 19.6–45.3)
MCH RBC QN AUTO: 30.3 PG (ref 26.6–33)
MCHC RBC AUTO-ENTMCNC: 32.6 G/DL (ref 31.5–35.7)
MCV RBC AUTO: 93.1 FL (ref 79–97)
MONOCYTES # BLD AUTO: 0.3 10*3/MM3 (ref 0.1–0.9)
MONOCYTES NFR BLD AUTO: 7.6 % (ref 5–12)
NEUTROPHILS # BLD AUTO: 1.98 10*3/MM3 (ref 1.7–7)
NEUTROPHILS NFR BLD AUTO: 49.8 % (ref 42.7–76)
NRBC BLD AUTO-RTO: 0 /100 WBC (ref 0–0.2)
PLATELET # BLD AUTO: 254 10*3/MM3 (ref 140–450)
PMV BLD AUTO: 10.8 FL (ref 6–12)
POTASSIUM BLD-SCNC: 4.2 MMOL/L (ref 3.5–5.2)
PROT SERPL-MCNC: 6.9 G/DL (ref 6–8.5)
RBC # BLD AUTO: 4.35 10*6/MM3 (ref 3.77–5.28)
SODIUM BLD-SCNC: 139 MMOL/L (ref 136–145)
T4 FREE SERPL-MCNC: 1.17 NG/DL (ref 0.93–1.7)
TRIGL SERPL-MCNC: 79 MG/DL (ref 0–150)
TSH SERPL DL<=0.05 MIU/L-ACNC: 3.02 UIU/ML (ref 0.27–4.2)
VIT B12 BLD-MCNC: 974 PG/ML (ref 211–946)
VLDLC SERPL-MCNC: 15.8 MG/DL (ref 5–40)
WBC NRBC COR # BLD: 3.97 10*3/MM3 (ref 3.4–10.8)

## 2019-12-10 PROCEDURE — 36415 COLL VENOUS BLD VENIPUNCTURE: CPT

## 2019-12-10 PROCEDURE — 82306 VITAMIN D 25 HYDROXY: CPT

## 2019-12-10 PROCEDURE — 80053 COMPREHEN METABOLIC PANEL: CPT

## 2019-12-10 PROCEDURE — 84439 ASSAY OF FREE THYROXINE: CPT

## 2019-12-10 PROCEDURE — 80061 LIPID PANEL: CPT

## 2019-12-10 PROCEDURE — 85025 COMPLETE CBC W/AUTO DIFF WBC: CPT

## 2019-12-10 PROCEDURE — 84443 ASSAY THYROID STIM HORMONE: CPT

## 2019-12-10 PROCEDURE — 82746 ASSAY OF FOLIC ACID SERUM: CPT

## 2019-12-10 PROCEDURE — 82607 VITAMIN B-12: CPT

## 2019-12-15 ENCOUNTER — PREP FOR SURGERY (OUTPATIENT)
Dept: OTHER | Facility: HOSPITAL | Age: 44
End: 2019-12-15

## 2019-12-15 DIAGNOSIS — R10.2 PELVIC PAIN: ICD-10-CM

## 2019-12-15 DIAGNOSIS — N93.9 ABNORMAL UTERINE BLEEDING (AUB): Primary | ICD-10-CM

## 2019-12-15 RX ORDER — SODIUM CHLORIDE 0.9 % (FLUSH) 0.9 %
10 SYRINGE (ML) INJECTION AS NEEDED
Status: CANCELLED | OUTPATIENT
Start: 2019-12-15

## 2019-12-15 RX ORDER — SODIUM CHLORIDE 0.9 % (FLUSH) 0.9 %
3 SYRINGE (ML) INJECTION EVERY 12 HOURS SCHEDULED
Status: CANCELLED | OUTPATIENT
Start: 2019-12-15

## 2019-12-17 ENCOUNTER — APPOINTMENT (OUTPATIENT)
Dept: PREADMISSION TESTING | Facility: HOSPITAL | Age: 44
End: 2019-12-17

## 2019-12-17 DIAGNOSIS — N93.9 ABNORMAL UTERINE BLEEDING (AUB): ICD-10-CM

## 2019-12-17 DIAGNOSIS — R10.2 PELVIC PAIN: ICD-10-CM

## 2019-12-17 LAB
ABO GROUP BLD: NORMAL
ANION GAP SERPL CALCULATED.3IONS-SCNC: 10.9 MMOL/L (ref 5–15)
BASOPHILS # BLD AUTO: 0.04 10*3/MM3 (ref 0–0.2)
BASOPHILS NFR BLD AUTO: 0.7 % (ref 0–1.5)
BLD GP AB SCN SERPL QL: NEGATIVE
BUN BLD-MCNC: 18 MG/DL (ref 6–20)
BUN/CREAT SERPL: 21.7 (ref 7–25)
CALCIUM SPEC-SCNC: 9.9 MG/DL (ref 8.6–10.5)
CHLORIDE SERPL-SCNC: 99 MMOL/L (ref 98–107)
CO2 SERPL-SCNC: 28.1 MMOL/L (ref 22–29)
CREAT BLD-MCNC: 0.83 MG/DL (ref 0.57–1)
DEPRECATED RDW RBC AUTO: 41.1 FL (ref 37–54)
EOSINOPHIL # BLD AUTO: 0.17 10*3/MM3 (ref 0–0.4)
EOSINOPHIL NFR BLD AUTO: 3.2 % (ref 0.3–6.2)
ERYTHROCYTE [DISTWIDTH] IN BLOOD BY AUTOMATED COUNT: 12.3 % (ref 12.3–15.4)
GFR SERPL CREATININE-BSD FRML MDRD: 75 ML/MIN/1.73
GLUCOSE BLD-MCNC: 88 MG/DL (ref 65–99)
HCT VFR BLD AUTO: 42.3 % (ref 34–46.6)
HGB BLD-MCNC: 14.3 G/DL (ref 12–15.9)
IMM GRANULOCYTES # BLD AUTO: 0.03 10*3/MM3 (ref 0–0.05)
IMM GRANULOCYTES NFR BLD AUTO: 0.6 % (ref 0–0.5)
LYMPHOCYTES # BLD AUTO: 1.83 10*3/MM3 (ref 0.7–3.1)
LYMPHOCYTES NFR BLD AUTO: 34 % (ref 19.6–45.3)
MCH RBC QN AUTO: 31 PG (ref 26.6–33)
MCHC RBC AUTO-ENTMCNC: 33.8 G/DL (ref 31.5–35.7)
MCV RBC AUTO: 91.8 FL (ref 79–97)
MONOCYTES # BLD AUTO: 0.33 10*3/MM3 (ref 0.1–0.9)
MONOCYTES NFR BLD AUTO: 6.1 % (ref 5–12)
NEUTROPHILS # BLD AUTO: 2.99 10*3/MM3 (ref 1.7–7)
NEUTROPHILS NFR BLD AUTO: 55.4 % (ref 42.7–76)
NRBC BLD AUTO-RTO: 0 /100 WBC (ref 0–0.2)
PLATELET # BLD AUTO: 236 10*3/MM3 (ref 140–450)
PMV BLD AUTO: 9.7 FL (ref 6–12)
POTASSIUM BLD-SCNC: 4.1 MMOL/L (ref 3.5–5.2)
RBC # BLD AUTO: 4.61 10*6/MM3 (ref 3.77–5.28)
RH BLD: POSITIVE
SODIUM BLD-SCNC: 138 MMOL/L (ref 136–145)
T&S EXPIRATION DATE: NORMAL
WBC NRBC COR # BLD: 5.39 10*3/MM3 (ref 3.4–10.8)

## 2019-12-17 PROCEDURE — 80048 BASIC METABOLIC PNL TOTAL CA: CPT | Performed by: ANESTHESIOLOGY

## 2019-12-17 PROCEDURE — 85025 COMPLETE CBC W/AUTO DIFF WBC: CPT | Performed by: NURSE PRACTITIONER

## 2019-12-17 PROCEDURE — 86901 BLOOD TYPING SEROLOGIC RH(D): CPT | Performed by: NURSE PRACTITIONER

## 2019-12-17 PROCEDURE — 36415 COLL VENOUS BLD VENIPUNCTURE: CPT

## 2019-12-17 PROCEDURE — 86850 RBC ANTIBODY SCREEN: CPT | Performed by: NURSE PRACTITIONER

## 2019-12-17 PROCEDURE — 86900 BLOOD TYPING SEROLOGIC ABO: CPT | Performed by: NURSE PRACTITIONER

## 2019-12-17 RX ORDER — UBIDECARENONE 100 MG
200 CAPSULE ORAL DAILY
COMMUNITY

## 2019-12-17 RX ORDER — PIMECROLIMUS 10 MG/G
1 CREAM TOPICAL 2 TIMES DAILY
COMMUNITY
End: 2021-10-04

## 2019-12-17 RX ORDER — IVERMECTIN 10 MG/G
1 CREAM TOPICAL NIGHTLY
COMMUNITY
End: 2023-02-20

## 2019-12-17 RX ORDER — LORATADINE 10 MG/1
10 TABLET ORAL DAILY
COMMUNITY
End: 2021-10-04

## 2019-12-17 RX ORDER — CLOBETASOL PROPIONATE 0.46 MG/ML
1 SOLUTION TOPICAL 2 TIMES DAILY
COMMUNITY

## 2019-12-17 RX ORDER — AMOXICILLIN AND CLAVULANATE POTASSIUM 875; 125 MG/1; MG/1
1 TABLET, FILM COATED ORAL 2 TIMES DAILY
COMMUNITY
Start: 2019-12-13 | End: 2019-12-23

## 2019-12-17 RX ORDER — MULTIVITAMIN
1 TABLET ORAL DAILY
COMMUNITY

## 2019-12-17 NOTE — DISCHARGE INSTRUCTIONS
0830---12/18/19     ARRIVAL TIME       TAKE the following medications the morning of surgery:  All heart or blood pressure medications    HOLD all diabetic medications the morning of surgery as ordered by physician.    Please discontinue all blood thinners and anticoagulants (except aspirin) prior to surgery as per your surgeon and cardiologist instructions.  Aspirin may be continued up to the day prior to surgery.     CHLORHEXIDINE CLOTHS GIVEN WITH INSTRUCTIONS AND FORM TO RETURN TO HOSPITAL    General Instructions:  · Do not eat or drink after midnight: includes water, mints, or gum. You may brush your teeth.  Dental appliances that are removable must be taken out day of surgery.  · Do not smoke, chew tobacco, or drink alcohol.  · Bring medications in original bottles, any inhalers and if applicable your C-PAP/BI-PAP machine.  · Bring any papers given to you in the doctor's office.  · Wear clean comfortable clothes and socks.  · Do not wear contact lenses or make-up. Bring a case for your glasses if applicable.  · Bring crutches or walker if applicable.  · Leave all other valuables and jewelry at home.    If you were given a blood bank ID arm band remember to bring it with you the day of surgery.    Preventing a Surgical Site Infection:  Shower the night before surgery (unless instructed other wise) using a fresh bar of anti-bacterial soap (such as Dial) and clean washcloth. Dry with a clean towel and dress in clean clothing.  For 2 to 3 days before surgery, avoid shaving with a razor near where you will have surgery because the razor can irritate skin and make it easier to develop an infection. Ask your surgeon if you will be receiving antibiotics prior to surgery.  Make sure you, your family, and all healthcare providers clear their hands with soap and water or an alcohol-based hand  before caring for you or your wound.  If at all possible, quit smoking as many days before surgery as you can.    Day of  surgery:  Upon arrival, a Pre-op nurse and Anesthesiologist will review your health history, obtain vital signs, and answer questions you may have. The only belongings needed at this time will be your home medications and if applicable your C-PAP/BI-PAP machine. If you are staying overnight your family can leave the rest of your belongings in the car and bring them to your room later. A Pre-op nurse will start an IV and you may receive medication in preparation for surgery, including something to help you relax. Your family will be able to see you in the Pre-op area. While you are in surgery your family should notify the waiting room  if they leave the waiting room area and provide a contact phone number.    Please be aware that surgery does come with discomfort. We want to make every effort to control your discomfort so please discuss any uncontrolled symptoms with your nurse. Your doctor will most likely have prescribed pain medications.    If you are going home after surgery you will receive individualized written care instructions before being discharged. A responsible adult must drive you to and from the hospital on the day of surgery and stay with you for 24 hours.    If you are staying overnight following surgery, you will be transported to your hospital room following the recovery period.  [facility] has all private rooms.    If you have any questions please call Pre-Admission Testing at [phone].  Deductibles and co-payments are collected on the day of service. Please be prepared to pay the required co-pay, deductible or deposit on the day of service as defined by your plan.

## 2019-12-17 NOTE — H&P
This 44 year old female presents for pre op.      History of Present Illness:  1.  pre op   Has been having 2-3 episodes of bleeding per month.  Her last ultrasound showed an enlarged fibroid uterus.  She does get pain from time to time. She has a history of an endometrial ablation. She would like to proceed with hysterectomy.  We discussed the risks and benefits in detail.                Screening Tools  Other Screenings:  Encounter Date Performed Date Instrument Score Severity/Interpretation MDD Classification   12/17/2019 12/17/2019 Patient Health Questionnaire (PHQ-2) 0 Further testing is not required        Gynecologic History:  Patient is perimenopausal.   Date of last Pap: 04/24/2019.    OBSTETRIC HISTORY    Not currently pregnant.   Past Systemic History    Medical History (Reviewed,updated)  Disease Onset Date Comments   IBS     intermittent palpitations     lumbar radiculopathy     mitral valve prolapse     muscle weakness     Rheumatoid arthritis involving multiple sites with positive Rheumatoid factor     Rosacea     Sjogren's disease     tremor     vitamin B complex deficiency     vitamin b12 deficiency     vitamin d deficiency     GERD with esophagitis     hx of fatigue     hx of severe lumbar pain     hx of tachycardia     chronic osteoarthritis     fibromyalgia     Asthma     Hypertension         Surgical History/Management (Reviewed,updated)  Management Date Comments   Ablation, D&C, BTL 2015    (L) breast lumpectomy 2010    Diagnostic Laproscopy     Cholecystectomy 2011    fibromyalgia     Diagnostics History:  Status Study Ordered Completed Interpretation  Result / Report   completed Duplex Study Of Extremity Veins, Limited Left leg 10/28/2019 10/30/2019   DOS 10/30/19   completed MAMMO DIAGNISTIC BI, INCL CAD  10/17/2018      completed MAMMO SCREENING BI, (2 VIEWS) INCL CAD  10/22/2019      obtained PAP, liquid based 02/27/2017       obtained PAP, liquid based 04/19/2018       completed SLEEP  STUDY, ATTENDED 06/19/2019 08/21/2019   DOS: 07/16/2019   completed TRANSVAGINAL US, NON-OB 04/24/2019 05/14/2019      ordered TRANSVAGINAL US, NON-OB 05/14/2019       ordered US Exam Breast Complete 04/24/2019           Pap Result, HPV Detail and Diagnostic/Treatment Performed  Date Test Procedure Pap Result HPV Detail Comments   04/24/2018  See module     03/03/2017  See module       PROBLEM LIST:   Problem List reviewed.   Problem Description Onset Date Chronic Clinical Status Notes   Fibromyalgia  Y     KERRIE (obstructive sleep apnea)  Y     Vitamin D deficiency  Y     Seasonal allergic rhinitis, unspecified trigger  Y     Chronic osteoarthritis  Y     Vitamin B12 deficiency  Y     History of asthma  Y     Psoriasis  Y           Medications (active prior to today)  Medication Name Sig Description Start Date Stop Date Refilled Rx Elsewhere   Plaquenil 200 mg tablet take 1 tablet by oral route  every day 02/27/2017   N   Women's One Daily 18 mg iron-400 mcg-500 mg Ca tablet qd 02/27/2017   N   fluticasone propionate (bulk) 100 % powder 2 sprays/each nostril qd 02/27/2017 12/17/2019  N   Soolantra 1 % topical cream apply by topical route  every day a pea-sized amount to cover areas of face with thin layer avoiding the eyes and lips 04/19/2018   N   Rhofade 1 % topical cream apply by topical route  every day a pea-sized amount to cover areas of face with thin layer avoiding the eyes and lips 04/19/2018   N   triamcinolone acetonide 0.1 % topical cream apply by topical route 2 times every day a thin layer to the affected area(s) 04/19/2018   N   clocortolone pivalate 0.1 % topical cream apply by topical route 3 times every day sparingly and rub gently into the affected area(s) 04/19/2018   N   Xiidra 5 % eye drops in a dropperette instill 1 drop by ophthalmic route 2 times every day into both eyes approximately 12 hours apart 04/24/2019   N   Provera 2.5 mg tablet TAKE 1 TABLET BY ORAL ROUTE FOR THE FIRST 5 DAYS OF  CYCLE. 05/02/2019 12/17/2019 05/02/2019 N   DME  MISCELL EACH Autopap and supplies - Settings 6/16 cm. 10/02/2019  10/02/2019 N   pimecrolimus 1 % topical cream apply by topical route 2 times every day a thin layer to the affected area(s) ; rub in gently and completely 11/11/2019   N   Proventil HFA 90 mcg/actuation aerosol inhaler inhale 2 puff by inhalation route  every 4 - 6 hours as needed 12/13/2019   N   Pulmicort Flexhaler 180 mcg/actuation breath activated inhale 1 puff by inhalation route 2 times every day 12/13/2019 12/13/2019 N   Augmentin 875 mg-125 mg tablet take 1 tablet by oral route  every 12 hours 12/13/2019   N   azelastine 0.15 % (205.5 mcg) nasal spray spray 1 spray by intranasal route 2 times every day in each nostril 12/13/2019 12/13/2019 N   Sernivo 0.05 % topical spray with pump apply by topical route 2 times every day to the affected area(s) 12/13/2019 12/13/2019 N   B-12 Compliance 1,000 mcg/mL injection kit inject 1 milliliter by intramuscular route  every month 12/13/2019 12/13/2019 N   Cymbalta 60 mg capsule,delayed release take 1 capsule by oral route  every day 12/13/2019 12/13/2019 N   Vitamin D2 50,000 unit capsule take 1 capsule by oral route  every week 12/13/2019 12/13/2019 N   Claritin 10 mg tablet take 1 tablet by oral route  every day 12/13/2019 12/13/2019 N   Singulair 10 mg tablet take 1 tablet by oral route  every day in the evening 12/13/2019 12/13/2019 N   Amerge 2.5 mg tablet take 1 tablet by oral route once may repeat after 4 hours 12/13/2019 12/13/2019 N   Protonix 40 mg tablet,delayed release take 1 tablet by oral route  every day 12/13/2019 12/13/2019 N     Patient Status   Completed with information received for patient transitioning into care.     Medication Reconciliation  Medications reconciled today.  Medication Reviewed  Adherence Medication Name Sig Desc Elsewhere Status   taking as directed Plaquenil 200 mg tablet take 1 tablet by oral route  every  day N Verified   taking as directed Rhofade 1 % topical cream apply by topical route  every day a pea-sized amount to cover areas of face with thin layer avoiding the eyes and lips N Verified   taking as directed Soolantra 1 % topical cream apply by topical route  every day a pea-sized amount to cover areas of face with thin layer avoiding the eyes and lips N Verified   taking as directed fluticasone propionate (bulk) 100 % powder 2 sprays/each nostril qd N Verified   taking as directed Women's One Daily 18 mg iron-400 mcg-500 mg Ca tablet qd N Verified   taking as directed Proventil HFA 90 mcg/actuation aerosol inhaler inhale 2 puff by inhalation route  every 4 - 6 hours as needed N Verified   taking as directed DME  MISCELL EACH Autopap and supplies - Settings 6/16 cm. N Verified   taking as directed Xiidra 5 % eye drops in a dropperette instill 1 drop by ophthalmic route 2 times every day into both eyes approximately 12 hours apart N Verified   taking as directed triamcinolone acetonide 0.1 % topical cream apply by topical route 2 times every day a thin layer to the affected area(s) N Verified   taking as directed Provera 2.5 mg tablet TAKE 1 TABLET BY ORAL ROUTE FOR THE FIRST 5 DAYS OF CYCLE. N Verified   taking as directed pimecrolimus 1 % topical cream apply by topical route 2 times every day a thin layer to the affected area(s) ; rub in gently and completely N Verified   taking as directed clocortolone pivalate 0.1 % topical cream apply by topical route 3 times every day sparingly and rub gently into the affected area(s) N Verified   taking as directed Pulmicort Flexhaler 180 mcg/actuation breath activated inhale 1 puff by inhalation route 2 times every day N Verified   taking as directed azelastine 0.15 % (205.5 mcg) nasal spray spray 1 spray by intranasal route 2 times every day in each nostril N Verified   taking as directed Augmentin 875 mg-125 mg tablet take 1 tablet by oral route  every 12 hours N  Verified   taking as directed B-12 Compliance 1,000 mcg/mL injection kit inject 1 milliliter by intramuscular route  every month N Verified   taking as directed Sernivo 0.05 % topical spray with pump apply by topical route 2 times every day to the affected area(s) N Verified   taking as directed Singulair 10 mg tablet take 1 tablet by oral route  every day in the evening N Verified   taking as directed Claritin 10 mg tablet take 1 tablet by oral route  every day N Verified   taking as directed Cymbalta 60 mg capsule,delayed release take 1 capsule by oral route  every day N Verified   taking as directed Vitamin D2 50,000 unit capsule take 1 capsule by oral route  every week N Verified   taking as directed Amerge 2.5 mg tablet take 1 tablet by oral route once may repeat after 4 hours N Verified   taking as directed Protonix 40 mg tablet,delayed release take 1 tablet by oral route  every day N Verified     Allergies:  Ingredient Reaction (Severity) Medication Name Comment   BUPROPION HCL  Zyban    CELECOXIB  Celebrex    MELOXICAM  Mobic    METAXALONE  Skelaxin    MOXIFLOXACIN HCL  Avelox        Family History  (Reviewed, updated)  Relationship Family Member Name  Age at Death Condition Onset Age Cause of Death   Father    Diabetes mellitus  N   Father    Heart attack  N   Mother    Cancer, skin  N   Paternal grandfather    Stomach  N   Paternal grandfather    Cancer, lung  N   Sister    Cancer, throat  N   M    Social History:  (Reviewed, updated)  Tobacco use reviewed.  Preferred language is English.    MARITAL STATUS/FAMILY/SOCIAL SUPPORT  Currently .    Tobacco use status: Ex-cigarette smoker.  Smoking status: Former smoker.    SMOKING STATUS  Type Smoking Status Usage Per Day Years Used Pack Years Total Pack Years   Cigarette Former smoker         TOBACCO CESSATION INFORMATION  Date Counseled By Order Status Description Code Tobacco Cessation Information   2019 Susan [facility] Tobacco cessation  counseling completed   Smoking cessation education (procedure)   12/17/2019 [facility] Tobacco cessation counseling completed   Smoking cessation education     VAPING USE  Screened for vaping? Yes  Status: Not a current user    TOBACCO/VAPING EXPOSURE  No passive vaping exposure.  No passive smoke exposure.    ALCOHOL  There is no history of alcohol use.   CAFFEINE  The patient uses caffeine: coffee.          Review of Systems  System Neg/Pos Details   Constitutional Negative Chills, Fatigue, Fever, Malaise, Night sweats, Weight gain and Weight loss.   ENMT Negative Ear drainage, Hearing loss, Nasal drainage, Otalgia, Sinus pressure and Sore throat.   Eyes Negative Eye discharge, Eye pain and Vision changes.   Respiratory Negative Chronic cough, Cough, Dyspnea, Known TB exposure and Wheezing.   Cardio Negative Chest pain, Claudication, Edema and Irregular heartbeat/palpitations.   GI Negative Abdominal pain, Blood in stool, Change in stool pattern, Constipation, Decreased appetite, Diarrhea, Heartburn, Nausea and Vomiting.    Negative Dysuria, Hematuria, Polyuria (Genitourinary), Urinary frequency, Urinary incontinence and Urinary retention.   Endocrine Negative Cold intolerance, Heat intolerance, Polydipsia and Polyphagia.   Neuro Negative Dizziness, Extremity weakness, Gait disturbance, Headache, Memory impairment, Numbness in extremity, Seizures and Tremors.   Psych Negative Anxiety, Depression and Insomnia.   Integumentary Negative Brittle hair, Brittle nails, Change in shape/size of mole(s), Hair loss, Hirsutism, Hives, Pruritus, Rash and Skin lesion.   MS Negative Back pain, Joint pain, Joint swelling, Muscle weakness and Neck pain.   Hema/Lymph Negative Easy bleeding, Easy bruising and Lymphadenopathy.   Allergic/Immuno Negative Contact allergy, Environmental allergies, Food allergies and Seasonal allergies.   Reproductive Positive Irregular menses.   Reproductive Negative Breast discharge and Breast  lumps.       Vital Signs     Time BP mm/Hg Pulse /min Resp /min Temp F Ht ft Ht in Ht cm Wt lb Wt kg BMI kg/m2 BSA m2 O2 Sat%   11:42 /83 83 17 98 5 4 162.56 260 117.934 44.63  97     Measured By  Time Measured by   11:42 AM Susan [facility]   Screening Summary:  The following were reviewed: tobacco use, alcohol use, drugs of abuse and date of last pap    Physical Exam  Exam Findings Details   Constitutional Normal Well developed.   Neck Exam Normal Palpation - Normal.   Respiratory Normal Inspection - Normal.   Cardiovascular Normal Regular rhythm.  No murmurs, gallops, or rubs.   Abdomen Normal Anterior palpation -  No rebound. No abdominal tenderness. No hepatic enlargement. No hernia.   Genitourinary * Pelvic deferred.   Skin Normal Inspection - Normal.   Psychiatric Normal Orientation - Oriented to time, place, person & situation. Appropriate mood and affect.       Completed Orders (this encounter)  Order Details Reason Side Interpretation Result Initial Treatment Date Region   Tobacco cessation counseling          Pre-Visit Planning          Patient Health Questionnaire (PHQ-2)    Further testing is not required 0       Assessment/Plan  # Detail Type Description    1. Assessment Abnormal uterine and vaginal bleeding, unspecified (N93.9).    Patient Plan  Pt to be at the hospital  day of procedure.  Pt educated to not eat or drink after midnight the day before surgery which includes water,mint or gum. You may brush your teeth.  Do NOT smoke, chew tobacco, or drink alcohol within 24 hours prior to surgery.  Wear clean, comfortable clothes and socks. No NOt wear contact lenses or make-up or dark nail polish.  Bring a case for your glasses if applicable.  Please keep your (red band - blood bank armband) and continue to wear until discharged after surgery.  Call if Questions Regarding Surgery                Diagnostic History Entered Today  Performed Study Interpretation Result   12/17/2019 Pre-Visit Planning      Patient Education  # Patient Education   1. Fibromyalgia: Care Instructions     Medications (Added, Continued or Stopped today):  Start Date Medication Directions PRN Status PRN Reason Instruction Stop Date   12/13/2019 Amerge 2.5 mg tablet take 1 tablet by oral route once may repeat after 4 hours N      12/13/2019 Augmentin 875 mg-125 mg tablet take 1 tablet by oral route  every 12 hours N      12/13/2019 azelastine 0.15 % (205.5 mcg) nasal spray spray 1 spray by intranasal route 2 times every day in each nostril N      12/13/2019 B-12 Compliance 1,000 mcg/mL injection kit inject 1 milliliter by intramuscular route  every month N      12/13/2019 Claritin 10 mg tablet take 1 tablet by oral route  every day N      04/19/2018 clocortolone pivalate 0.1 % topical cream apply by topical route 3 times every day sparingly and rub gently into the affected area(s) N      12/13/2019 Cymbalta 60 mg capsule,delayed release take 1 capsule by oral route  every day N      10/02/2019 DME  MISCELL EACH Autopap and supplies - Settings 6/16 cm. N      02/27/2017 fluticasone propionate (bulk) 100 % powder 2 sprays/each nostril qd N   12/17/2019 11/11/2019 pimecrolimus 1 % topical cream apply by topical route 2 times every day a thin layer to the affected area(s) ; rub in gently and completely N      12/17/2019 pimecrolimus 1 % topical cream apply by topical route 2 times every day a thin layer to the affected area(s) ; rub in gently and completely N      02/27/2017 Plaquenil 200 mg tablet take 1 tablet by oral route  every day N      12/13/2019 Protonix 40 mg tablet,delayed release take 1 tablet by oral route  every day N      12/13/2019 Proventil HFA 90 mcg/actuation aerosol inhaler inhale 2 puff by inhalation route  every 4 - 6 hours as needed N      05/02/2019 Provera 2.5 mg tablet TAKE 1 TABLET BY ORAL ROUTE FOR THE FIRST 5 DAYS OF CYCLE. N   12/17/2019 12/13/2019 Pulmicort Flexhaler 180 mcg/actuation breath activated inhale  1 puff by inhalation route 2 times every day N      04/19/2018 Rhofade 1 % topical cream apply by topical route  every day a pea-sized amount to cover areas of face with thin layer avoiding the eyes and lips N      12/13/2019 Sernivo 0.05 % topical spray with pump apply by topical route 2 times every day to the affected area(s) N      12/13/2019 Singulair 10 mg tablet take 1 tablet by oral route  every day in the evening N      04/19/2018 Soolantra 1 % topical cream apply by topical route  every day a pea-sized amount to cover areas of face with thin layer avoiding the eyes and lips N      04/19/2018 triamcinolone acetonide 0.1 % topical cream apply by topical route 2 times every day a thin layer to the affected area(s) N      12/13/2019 Vitamin D2 50,000 unit capsule take 1 capsule by oral route  every week N      02/27/2017 Women's One Daily 18 mg iron-400 mcg-500 mg Ca tablet qd N      04/24/2019 Xiidra 5 % eye drops in a dropperette instill 1 drop by ophthalmic route 2 times every day into both eyes approximately 12 hours apart N          Counseling / Educational Factors:  Counseling / educational factors reviewed.        This 44 year old female presents for pre op.      History of Present Illness:  1.  pre op   Has been having 2-3 episodes of bleeding per month.  Her last ultrasound showed an enlarged fibroid uterus.  She does get pain from time to time. She has a history of an endometrial ablation. She would like to proceed with hysterectomy.  We discussed the risks and benefits in detail.                Screening Tools  Other Screenings:  Encounter Date Performed Date Instrument Score Severity/Interpretation MDD Classification   12/17/2019 12/17/2019 Patient Health Questionnaire (PHQ-2) 0 Further testing is not required        Gynecologic History:  Patient is perimenopausal.   Date of last Pap: 04/24/2019.    OBSTETRIC HISTORY    Not currently pregnant.   Past Systemic History    Medical History  (Reviewed,updated)  Disease Onset Date Comments   IBS     intermittent palpitations     lumbar radiculopathy     mitral valve prolapse     muscle weakness     Rheumatoid arthritis involving multiple sites with positive Rheumatoid factor     Rosacea     Sjogren's disease     tremor     vitamin B complex deficiency     vitamin b12 deficiency     vitamin d deficiency     GERD with esophagitis     hx of fatigue     hx of severe lumbar pain     hx of tachycardia     chronic osteoarthritis     fibromyalgia     Asthma     Hypertension         Surgical History/Management (Reviewed,updated)  Management Date Comments   Ablation, D&C, BTL 2015    (L) breast lumpectomy 2010    Diagnostic Laproscopy     Cholecystectomy 2011    fibromyalgia     Diagnostics History:  Status Study Ordered Completed Interpretation  Result / Report   completed Duplex Study Of Extremity Veins, Limited Left leg 10/28/2019 10/30/2019   DOS 10/30/19   completed MAMMO DIAGNISTIC BI, INCL CAD  10/17/2018      completed MAMMO SCREENING BI, (2 VIEWS) INCL CAD  10/22/2019      obtained PAP, liquid based 02/27/2017       obtained PAP, liquid based 04/19/2018       completed SLEEP STUDY, ATTENDED 06/19/2019 08/21/2019   DOS: 07/16/2019   completed TRANSVAGINAL US, NON-OB 04/24/2019 05/14/2019      ordered TRANSVAGINAL US, NON-OB 05/14/2019       ordered US Exam Breast Complete 04/24/2019           Pap Result, HPV Detail and Diagnostic/Treatment Performed  Date Test Procedure Pap Result HPV Detail Comments   04/24/2018  See module     03/03/2017  See module       PROBLEM LIST:   Problem List reviewed.   Problem Description Onset Date Chronic Clinical Status Notes   Fibromyalgia  Y     KERRIE (obstructive sleep apnea)  Y     Vitamin D deficiency  Y     Seasonal allergic rhinitis, unspecified trigger  Y     Chronic osteoarthritis  Y     Vitamin B12 deficiency  Y     History of asthma  Y     Psoriasis  Y           Medications (active prior to today)  Medication Name  Sig Description Start Date Stop Date Refilled Rx Elsewhere   Plaquenil 200 mg tablet take 1 tablet by oral route  every day 02/27/2017   N   Women's One Daily 18 mg iron-400 mcg-500 mg Ca tablet qd 02/27/2017   N   fluticasone propionate (bulk) 100 % powder 2 sprays/each nostril qd 02/27/2017 12/17/2019  N   Soolantra 1 % topical cream apply by topical route  every day a pea-sized amount to cover areas of face with thin layer avoiding the eyes and lips 04/19/2018   N   Rhofade 1 % topical cream apply by topical route  every day a pea-sized amount to cover areas of face with thin layer avoiding the eyes and lips 04/19/2018   N   triamcinolone acetonide 0.1 % topical cream apply by topical route 2 times every day a thin layer to the affected area(s) 04/19/2018   N   clocortolone pivalate 0.1 % topical cream apply by topical route 3 times every day sparingly and rub gently into the affected area(s) 04/19/2018   N   Xiidra 5 % eye drops in a dropperette instill 1 drop by ophthalmic route 2 times every day into both eyes approximately 12 hours apart 04/24/2019   N   Provera 2.5 mg tablet TAKE 1 TABLET BY ORAL ROUTE FOR THE FIRST 5 DAYS OF CYCLE. 05/02/2019 12/17/2019 05/02/2019 N   DME  MISCELL EACH Autopap and supplies - Settings 6/16 cm. 10/02/2019  10/02/2019 N   pimecrolimus 1 % topical cream apply by topical route 2 times every day a thin layer to the affected area(s) ; rub in gently and completely 11/11/2019   N   Proventil HFA 90 mcg/actuation aerosol inhaler inhale 2 puff by inhalation route  every 4 - 6 hours as needed 12/13/2019   N   Pulmicort Flexhaler 180 mcg/actuation breath activated inhale 1 puff by inhalation route 2 times every day 12/13/2019 12/13/2019 N   Augmentin 875 mg-125 mg tablet take 1 tablet by oral route  every 12 hours 12/13/2019   N   azelastine 0.15 % (205.5 mcg) nasal spray spray 1 spray by intranasal route 2 times every day in each nostril 12/13/2019 12/13/2019 N   Sernivo 0.05 %  topical spray with pump apply by topical route 2 times every day to the affected area(s) 12/13/2019 12/13/2019 N   B-12 Compliance 1,000 mcg/mL injection kit inject 1 milliliter by intramuscular route  every month 12/13/2019 12/13/2019 N   Cymbalta 60 mg capsule,delayed release take 1 capsule by oral route  every day 12/13/2019 12/13/2019 N   Vitamin D2 50,000 unit capsule take 1 capsule by oral route  every week 12/13/2019 12/13/2019 N   Claritin 10 mg tablet take 1 tablet by oral route  every day 12/13/2019 12/13/2019 N   Singulair 10 mg tablet take 1 tablet by oral route  every day in the evening 12/13/2019 12/13/2019 N   Amerge 2.5 mg tablet take 1 tablet by oral route once may repeat after 4 hours 12/13/2019 12/13/2019 N   Protonix 40 mg tablet,delayed release take 1 tablet by oral route  every day 12/13/2019 12/13/2019 N     Patient Status   Completed with information received for patient transitioning into care.     Medication Reconciliation  Medications reconciled today.  Medication Reviewed  Adherence Medication Name Sig Desc Elsewhere Status   taking as directed Plaquenil 200 mg tablet take 1 tablet by oral route  every day N Verified   taking as directed Rhofade 1 % topical cream apply by topical route  every day a pea-sized amount to cover areas of face with thin layer avoiding the eyes and lips N Verified   taking as directed Soolantra 1 % topical cream apply by topical route  every day a pea-sized amount to cover areas of face with thin layer avoiding the eyes and lips N Verified   taking as directed fluticasone propionate (bulk) 100 % powder 2 sprays/each nostril qd N Verified   taking as directed Women's One Daily 18 mg iron-400 mcg-500 mg Ca tablet qd N Verified   taking as directed Proventil HFA 90 mcg/actuation aerosol inhaler inhale 2 puff by inhalation route  every 4 - 6 hours as needed N Verified   taking as directed DME  MISCELL EACH Autopap and supplies - Settings 6/16 cm. N Verified    taking as directed Xiidra 5 % eye drops in a dropperette instill 1 drop by ophthalmic route 2 times every day into both eyes approximately 12 hours apart N Verified   taking as directed triamcinolone acetonide 0.1 % topical cream apply by topical route 2 times every day a thin layer to the affected area(s) N Verified   taking as directed Provera 2.5 mg tablet TAKE 1 TABLET BY ORAL ROUTE FOR THE FIRST 5 DAYS OF CYCLE. N Verified   taking as directed pimecrolimus 1 % topical cream apply by topical route 2 times every day a thin layer to the affected area(s) ; rub in gently and completely N Verified   taking as directed clocortolone pivalate 0.1 % topical cream apply by topical route 3 times every day sparingly and rub gently into the affected area(s) N Verified   taking as directed Pulmicort Flexhaler 180 mcg/actuation breath activated inhale 1 puff by inhalation route 2 times every day N Verified   taking as directed azelastine 0.15 % (205.5 mcg) nasal spray spray 1 spray by intranasal route 2 times every day in each nostril N Verified   taking as directed Augmentin 875 mg-125 mg tablet take 1 tablet by oral route  every 12 hours N Verified   taking as directed B-12 Compliance 1,000 mcg/mL injection kit inject 1 milliliter by intramuscular route  every month N Verified   taking as directed Sernivo 0.05 % topical spray with pump apply by topical route 2 times every day to the affected area(s) N Verified   taking as directed Singulair 10 mg tablet take 1 tablet by oral route  every day in the evening N Verified   taking as directed Claritin 10 mg tablet take 1 tablet by oral route  every day N Verified   taking as directed Cymbalta 60 mg capsule,delayed release take 1 capsule by oral route  every day N Verified   taking as directed Vitamin D2 50,000 unit capsule take 1 capsule by oral route  every week N Verified   taking as directed Amerge 2.5 mg tablet take 1 tablet by oral route once may repeat after 4 hours N  Verified   taking as directed Protonix 40 mg tablet,delayed release take 1 tablet by oral route  every day N Verified     Allergies:  Ingredient Reaction (Severity) Medication Name Comment   BUPROPION HCL  Zyban    CELECOXIB  Celebrex    MELOXICAM  Mobic    METAXALONE  Skelaxin    MOXIFLOXACIN HCL  Avelox        Family History  (Reviewed, updated)  Relationship Family Member Name  Age at Death Condition Onset Age Cause of Death   Father    Diabetes mellitus  N   Father    Heart attack  N   Mother    Cancer, skin  N   Paternal grandfather    Stomach  N   Paternal grandfather    Cancer, lung  N   Sister    Cancer, throat  N   M    Social History:  (Reviewed, updated)  Tobacco use reviewed.  Preferred language is English.    MARITAL STATUS/FAMILY/SOCIAL SUPPORT  Currently .    Tobacco use status: Ex-cigarette smoker.  Smoking status: Former smoker.    SMOKING STATUS  Type Smoking Status Usage Per Day Years Used Pack Years Total Pack Years   Cigarette Former smoker         TOBACCO CESSATION INFORMATION  Date Counseled By Order Status Description Code Tobacco Cessation Information   2019 [facility] Tobacco cessation counseling completed   Smoking cessation education (procedure)   2019 [facility] Tobacco cessation counseling completed   Smoking cessation education     VAPING USE  Screened for vaping? Yes  Status: Not a current user    TOBACCO/VAPING EXPOSURE  No passive vaping exposure.  No passive smoke exposure.    ALCOHOL  There is no history of alcohol use.   CAFFEINE  The patient uses caffeine: coffee.          Review of Systems  System Neg/Pos Details   Constitutional Negative Chills, Fatigue, Fever, Malaise, Night sweats, Weight gain and Weight loss.   ENMT Negative Ear drainage, Hearing loss, Nasal drainage, Otalgia, Sinus pressure and Sore throat.   Eyes Negative Eye discharge, Eye pain and Vision changes.   Respiratory Negative Chronic cough, Cough, Dyspnea, Known TB exposure and  Wheezing.   Cardio Negative Chest pain, Claudication, Edema and Irregular heartbeat/palpitations.   GI Negative Abdominal pain, Blood in stool, Change in stool pattern, Constipation, Decreased appetite, Diarrhea, Heartburn, Nausea and Vomiting.    Negative Dysuria, Hematuria, Polyuria (Genitourinary), Urinary frequency, Urinary incontinence and Urinary retention.   Endocrine Negative Cold intolerance, Heat intolerance, Polydipsia and Polyphagia.   Neuro Negative Dizziness, Extremity weakness, Gait disturbance, Headache, Memory impairment, Numbness in extremity, Seizures and Tremors.   Psych Negative Anxiety, Depression and Insomnia.   Integumentary Negative Brittle hair, Brittle nails, Change in shape/size of mole(s), Hair loss, Hirsutism, Hives, Pruritus, Rash and Skin lesion.   MS Negative Back pain, Joint pain, Joint swelling, Muscle weakness and Neck pain.   Hema/Lymph Negative Easy bleeding, Easy bruising and Lymphadenopathy.   Allergic/Immuno Negative Contact allergy, Environmental allergies, Food allergies and Seasonal allergies.   Reproductive Positive Irregular menses.   Reproductive Negative Breast discharge and Breast lumps.       Vital Signs     Time BP mm/Hg Pulse /min Resp /min Temp F Ht ft Ht in Ht cm Wt lb Wt kg BMI kg/m2 BSA m2 O2 Sat%   11:42 /83 83 17 98 5 4 162.56 260 117.934 44.63  97     Measured By  Time Measured by   11:42 AM [facility]   Screening Summary:  The following were reviewed: tobacco use, alcohol use, drugs of abuse and date of last pap    Physical Exam  Exam Findings Details   Constitutional Normal Well developed.   Neck Exam Normal Palpation - Normal.   Respiratory Normal Inspection - Normal.   Cardiovascular Normal Regular rhythm.  No murmurs, gallops, or rubs.   Abdomen Normal Anterior palpation -  No rebound. No abdominal tenderness. No hepatic enlargement. No hernia.   Genitourinary * Pelvic deferred.   Skin Normal Inspection - Normal.   Psychiatric Normal  Orientation - Oriented to time, place, person & situation. Appropriate mood and affect.       Completed Orders (this encounter)  Order Details Reason Side Interpretation Result Initial Treatment Date Region   Tobacco cessation counseling          Pre-Visit Planning          Patient Health Questionnaire (PHQ-2)    Further testing is not required 0       Assessment/Plan  # Detail Type Description    1. Assessment Abnormal uterine and vaginal bleeding, unspecified (N93.9).    Patient Plan  Pt to be at the hospital  day of procedure.  Pt educated to not eat or drink after midnight the day before surgery which includes water,mint or gum. You may brush your teeth.  Do NOT smoke, chew tobacco, or drink alcohol within 24 hours prior to surgery.  Wear clean, comfortable clothes and socks. No NOt wear contact lenses or make-up or dark nail polish.  Bring a case for your glasses if applicable.  Please keep your (red band - blood bank armband) and continue to wear until discharged after surgery.  Call if Questions Regarding Surgery                Diagnostic History Entered Today  Performed Study Interpretation Result   12/17/2019 Pre-Visit Planning     Patient Education  # Patient Education   1. Fibromyalgia: Care Instructions     Medications (Added, Continued or Stopped today):  Start Date Medication Directions PRN Status PRN Reason Instruction Stop Date   12/13/2019 Amerge 2.5 mg tablet take 1 tablet by oral route once may repeat after 4 hours N      12/13/2019 Augmentin 875 mg-125 mg tablet take 1 tablet by oral route  every 12 hours N      12/13/2019 azelastine 0.15 % (205.5 mcg) nasal spray spray 1 spray by intranasal route 2 times every day in each nostril N      12/13/2019 B-12 Compliance 1,000 mcg/mL injection kit inject 1 milliliter by intramuscular route  every month N      12/13/2019 Claritin 10 mg tablet take 1 tablet by oral route  every day N      04/19/2018 clocortolone pivalate 0.1 % topical cream apply by  topical route 3 times every day sparingly and rub gently into the affected area(s) N      12/13/2019 Cymbalta 60 mg capsule,delayed release take 1 capsule by oral route  every day N      10/02/2019 DME  MISCELL EACH [employer] and supplies - Settings 6/16 cm. N      02/27/2017 fluticasone propionate (bulk) 100 % powder 2 sprays/each nostril qd N   12/17/2019 11/11/2019 pimecrolimus 1 % topical cream apply by topical route 2 times every day a thin layer to the affected area(s) ; rub in gently and completely N      12/17/2019 pimecrolimus 1 % topical cream apply by topical route 2 times every day a thin layer to the affected area(s) ; rub in gently and completely N      02/27/2017 Plaquenil 200 mg tablet take 1 tablet by oral route  every day N      12/13/2019 Protonix 40 mg tablet,delayed release take 1 tablet by oral route  every day N      12/13/2019 Proventil HFA 90 mcg/actuation aerosol inhaler inhale 2 puff by inhalation route  every 4 - 6 hours as needed N      05/02/2019 Provera 2.5 mg tablet TAKE 1 TABLET BY ORAL ROUTE FOR THE FIRST 5 DAYS OF CYCLE. N   12/17/2019 12/13/2019 Pulmicort Flexhaler 180 mcg/actuation breath activated inhale 1 puff by inhalation route 2 times every day N      04/19/2018 Rhofade 1 % topical cream apply by topical route  every day a pea-sized amount to cover areas of face with thin layer avoiding the eyes and lips N      12/13/2019 Sernivo 0.05 % topical spray with pump apply by topical route 2 times every day to the affected area(s) N      12/13/2019 Singulair 10 mg tablet take 1 tablet by oral route  every day in the evening N      04/19/2018 Soolantra 1 % topical cream apply by topical route  every day a pea-sized amount to cover areas of face with thin layer avoiding the eyes and lips N      04/19/2018 triamcinolone acetonide 0.1 % topical cream apply by topical route 2 times every day a thin layer to the affected area(s) N      12/13/2019 Vitamin D2 50,000 unit capsule take 1  capsule by oral route  every week N      02/27/2017 Women's One Daily 18 mg iron-400 mcg-500 mg Ca tablet qd N      04/24/2019 Xiidra 5 % eye drops in a dropperette instill 1 drop by ophthalmic route 2 times every day into both eyes approximately 12 hours apart N          Counseling / Educational Factors:  Counseling / educational factors reviewed.

## 2019-12-18 ENCOUNTER — HOSPITAL ENCOUNTER (OUTPATIENT)
Facility: HOSPITAL | Age: 44
Discharge: HOME | End: 2019-12-19
Attending: OBSTETRICS & GYNECOLOGY | Admitting: NURSE PRACTITIONER

## 2019-12-18 ENCOUNTER — ANESTHESIA EVENT (OUTPATIENT)
Dept: PERIOP | Facility: HOSPITAL | Age: 44
End: 2019-12-18

## 2019-12-18 ENCOUNTER — APPOINTMENT (OUTPATIENT)
Dept: GENERAL RADIOLOGY | Facility: HOSPITAL | Age: 44
End: 2019-12-18

## 2019-12-18 ENCOUNTER — ANESTHESIA (OUTPATIENT)
Dept: PERIOP | Facility: HOSPITAL | Age: 44
End: 2019-12-18

## 2019-12-18 DIAGNOSIS — N93.9 ABNORMAL UTERINE BLEEDING: ICD-10-CM

## 2019-12-18 DIAGNOSIS — N93.9 ABNORMAL UTERINE BLEEDING (AUB): ICD-10-CM

## 2019-12-18 DIAGNOSIS — D25.9 UTERINE LEIOMYOMA: ICD-10-CM

## 2019-12-18 DIAGNOSIS — R10.2 PELVIC PAIN: ICD-10-CM

## 2019-12-18 LAB
B-HCG UR QL: NEGATIVE
INTERNAL NEGATIVE CONTROL: NEGATIVE
INTERNAL POSITIVE CONTROL: POSITIVE
Lab: NORMAL

## 2019-12-18 PROCEDURE — G0378 HOSPITAL OBSERVATION PER HR: HCPCS

## 2019-12-18 PROCEDURE — 25010000002 PROPOFOL 10 MG/ML EMULSION: Performed by: NURSE ANESTHETIST, CERTIFIED REGISTERED

## 2019-12-18 PROCEDURE — 25010000002 ONDANSETRON PER 1 MG: Performed by: NURSE ANESTHETIST, CERTIFIED REGISTERED

## 2019-12-18 PROCEDURE — 25010000002 DEXAMETHASONE PER 1 MG: Performed by: NURSE ANESTHETIST, CERTIFIED REGISTERED

## 2019-12-18 PROCEDURE — 25010000002 CEFOXITIN: Performed by: NURSE PRACTITIONER

## 2019-12-18 PROCEDURE — 25010000002 FENTANYL CITRATE (PF) 100 MCG/2ML SOLUTION: Performed by: NURSE ANESTHETIST, CERTIFIED REGISTERED

## 2019-12-18 PROCEDURE — 25010000002 HYDROMORPHONE PER 4 MG: Performed by: NURSE ANESTHETIST, CERTIFIED REGISTERED

## 2019-12-18 PROCEDURE — 25010000002 MIDAZOLAM PER 1 MG: Performed by: NURSE ANESTHETIST, CERTIFIED REGISTERED

## 2019-12-18 PROCEDURE — 25010000002 NEOSTIGMINE 10 MG/10ML SOLUTION: Performed by: NURSE ANESTHETIST, CERTIFIED REGISTERED

## 2019-12-18 PROCEDURE — 81025 URINE PREGNANCY TEST: CPT | Performed by: ANESTHESIOLOGY

## 2019-12-18 PROCEDURE — 25010000002 PROMETHAZINE PER 50 MG: Performed by: ANESTHESIOLOGY

## 2019-12-18 PROCEDURE — 25010000002 KETOROLAC TROMETHAMINE PER 15 MG: Performed by: OBSTETRICS & GYNECOLOGY

## 2019-12-18 RX ORDER — HYDROXYCHLOROQUINE SULFATE 200 MG/1
200 TABLET, FILM COATED ORAL 2 TIMES DAILY
Status: DISCONTINUED | OUTPATIENT
Start: 2019-12-18 | End: 2019-12-19 | Stop reason: HOSPADM

## 2019-12-18 RX ORDER — ONDANSETRON 4 MG/1
4 TABLET, FILM COATED ORAL EVERY 6 HOURS PRN
Status: DISCONTINUED | OUTPATIENT
Start: 2019-12-18 | End: 2019-12-19 | Stop reason: HOSPADM

## 2019-12-18 RX ORDER — ONDANSETRON 2 MG/ML
INJECTION INTRAMUSCULAR; INTRAVENOUS AS NEEDED
Status: DISCONTINUED | OUTPATIENT
Start: 2019-12-18 | End: 2019-12-18 | Stop reason: SURG

## 2019-12-18 RX ORDER — HYDROMORPHONE HYDROCHLORIDE 1 MG/ML
0.5 INJECTION, SOLUTION INTRAMUSCULAR; INTRAVENOUS; SUBCUTANEOUS
Status: DISCONTINUED | OUTPATIENT
Start: 2019-12-18 | End: 2019-12-19 | Stop reason: HOSPADM

## 2019-12-18 RX ORDER — MIDAZOLAM HYDROCHLORIDE 1 MG/ML
INJECTION INTRAMUSCULAR; INTRAVENOUS AS NEEDED
Status: DISCONTINUED | OUTPATIENT
Start: 2019-12-18 | End: 2019-12-18 | Stop reason: SURG

## 2019-12-18 RX ORDER — ZOLPIDEM TARTRATE 5 MG/1
5 TABLET ORAL NIGHTLY PRN
Status: DISCONTINUED | OUTPATIENT
Start: 2019-12-18 | End: 2019-12-19 | Stop reason: HOSPADM

## 2019-12-18 RX ORDER — SODIUM CHLORIDE, SODIUM LACTATE, POTASSIUM CHLORIDE, CALCIUM CHLORIDE 600; 310; 30; 20 MG/100ML; MG/100ML; MG/100ML; MG/100ML
125 INJECTION, SOLUTION INTRAVENOUS CONTINUOUS
Status: DISCONTINUED | OUTPATIENT
Start: 2019-12-18 | End: 2019-12-18 | Stop reason: HOSPADM

## 2019-12-18 RX ORDER — OXYCODONE HYDROCHLORIDE AND ACETAMINOPHEN 5; 325 MG/1; MG/1
1 TABLET ORAL ONCE AS NEEDED
Status: DISCONTINUED | OUTPATIENT
Start: 2019-12-18 | End: 2019-12-18 | Stop reason: HOSPADM

## 2019-12-18 RX ORDER — SODIUM CHLORIDE 0.9 % (FLUSH) 0.9 %
10 SYRINGE (ML) INJECTION EVERY 12 HOURS SCHEDULED
Status: DISCONTINUED | OUTPATIENT
Start: 2019-12-18 | End: 2019-12-18 | Stop reason: HOSPADM

## 2019-12-18 RX ORDER — BUPIVACAINE HYDROCHLORIDE AND EPINEPHRINE 5; 5 MG/ML; UG/ML
INJECTION, SOLUTION EPIDURAL; INTRACAUDAL; PERINEURAL AS NEEDED
Status: DISCONTINUED | OUTPATIENT
Start: 2019-12-18 | End: 2019-12-18 | Stop reason: HOSPADM

## 2019-12-18 RX ORDER — DEXAMETHASONE SODIUM PHOSPHATE 10 MG/ML
INJECTION INTRAMUSCULAR; INTRAVENOUS AS NEEDED
Status: DISCONTINUED | OUTPATIENT
Start: 2019-12-18 | End: 2019-12-18 | Stop reason: SURG

## 2019-12-18 RX ORDER — OXYCODONE HYDROCHLORIDE AND ACETAMINOPHEN 5; 325 MG/1; MG/1
1 TABLET ORAL EVERY 4 HOURS PRN
Status: DISCONTINUED | OUTPATIENT
Start: 2019-12-18 | End: 2019-12-19 | Stop reason: HOSPADM

## 2019-12-18 RX ORDER — IBUPROFEN 800 MG/1
800 TABLET ORAL 3 TIMES DAILY
Status: DISCONTINUED | OUTPATIENT
Start: 2019-12-18 | End: 2019-12-19 | Stop reason: HOSPADM

## 2019-12-18 RX ORDER — ONDANSETRON 2 MG/ML
4 INJECTION INTRAMUSCULAR; INTRAVENOUS AS NEEDED
Status: DISCONTINUED | OUTPATIENT
Start: 2019-12-18 | End: 2019-12-18 | Stop reason: HOSPADM

## 2019-12-18 RX ORDER — MONTELUKAST SODIUM 10 MG/1
10 TABLET ORAL NIGHTLY
Status: DISCONTINUED | OUTPATIENT
Start: 2019-12-18 | End: 2019-12-19 | Stop reason: HOSPADM

## 2019-12-18 RX ORDER — NALOXONE HCL 0.4 MG/ML
0.1 VIAL (ML) INJECTION
Status: DISCONTINUED | OUTPATIENT
Start: 2019-12-18 | End: 2019-12-19 | Stop reason: HOSPADM

## 2019-12-18 RX ORDER — SCOLOPAMINE TRANSDERMAL SYSTEM 1 MG/1
PATCH, EXTENDED RELEASE TRANSDERMAL AS NEEDED
Status: DISCONTINUED | OUTPATIENT
Start: 2019-12-18 | End: 2019-12-18 | Stop reason: SURG

## 2019-12-18 RX ORDER — METOCLOPRAMIDE HYDROCHLORIDE 5 MG/ML
10 INJECTION INTRAMUSCULAR; INTRAVENOUS EVERY 6 HOURS PRN
Status: DISCONTINUED | OUTPATIENT
Start: 2019-12-18 | End: 2019-12-19 | Stop reason: HOSPADM

## 2019-12-18 RX ORDER — FLUTICASONE PROPIONATE 50 MCG
2 SPRAY, SUSPENSION (ML) NASAL DAILY PRN
Status: DISCONTINUED | OUTPATIENT
Start: 2019-12-18 | End: 2019-12-19 | Stop reason: HOSPADM

## 2019-12-18 RX ORDER — MAGNESIUM HYDROXIDE 1200 MG/15ML
LIQUID ORAL AS NEEDED
Status: DISCONTINUED | OUTPATIENT
Start: 2019-12-18 | End: 2019-12-18 | Stop reason: HOSPADM

## 2019-12-18 RX ORDER — HYDROMORPHONE HCL 110MG/55ML
PATIENT CONTROLLED ANALGESIA SYRINGE INTRAVENOUS AS NEEDED
Status: DISCONTINUED | OUTPATIENT
Start: 2019-12-18 | End: 2019-12-18 | Stop reason: SURG

## 2019-12-18 RX ORDER — DULOXETIN HYDROCHLORIDE 60 MG/1
60 CAPSULE, DELAYED RELEASE ORAL DAILY
Status: DISCONTINUED | OUTPATIENT
Start: 2019-12-18 | End: 2019-12-19 | Stop reason: HOSPADM

## 2019-12-18 RX ORDER — BUPIVACAINE HYDROCHLORIDE 5 MG/ML
INJECTION, SOLUTION EPIDURAL; INTRACAUDAL AS NEEDED
Status: DISCONTINUED | OUTPATIENT
Start: 2019-12-18 | End: 2019-12-18 | Stop reason: HOSPADM

## 2019-12-18 RX ORDER — FENTANYL CITRATE 50 UG/ML
INJECTION, SOLUTION INTRAMUSCULAR; INTRAVENOUS AS NEEDED
Status: DISCONTINUED | OUTPATIENT
Start: 2019-12-18 | End: 2019-12-18 | Stop reason: SURG

## 2019-12-18 RX ORDER — MEPERIDINE HYDROCHLORIDE 25 MG/ML
INJECTION INTRAMUSCULAR; INTRAVENOUS; SUBCUTANEOUS AS NEEDED
Status: DISCONTINUED | OUTPATIENT
Start: 2019-12-18 | End: 2019-12-18 | Stop reason: SURG

## 2019-12-18 RX ORDER — SODIUM CHLORIDE 0.9 % (FLUSH) 0.9 %
10 SYRINGE (ML) INJECTION AS NEEDED
Status: DISCONTINUED | OUTPATIENT
Start: 2019-12-18 | End: 2019-12-18 | Stop reason: HOSPADM

## 2019-12-18 RX ORDER — SODIUM CHLORIDE 0.9 % (FLUSH) 0.9 %
3 SYRINGE (ML) INJECTION EVERY 12 HOURS SCHEDULED
Status: DISCONTINUED | OUTPATIENT
Start: 2019-12-18 | End: 2019-12-18 | Stop reason: HOSPADM

## 2019-12-18 RX ORDER — SODIUM CHLORIDE, SODIUM LACTATE, POTASSIUM CHLORIDE, CALCIUM CHLORIDE 600; 310; 30; 20 MG/100ML; MG/100ML; MG/100ML; MG/100ML
125 INJECTION, SOLUTION INTRAVENOUS CONTINUOUS
Status: DISCONTINUED | OUTPATIENT
Start: 2019-12-18 | End: 2019-12-19 | Stop reason: HOSPADM

## 2019-12-18 RX ORDER — PANTOPRAZOLE SODIUM 40 MG/1
40 TABLET, DELAYED RELEASE ORAL
Status: DISCONTINUED | OUTPATIENT
Start: 2019-12-19 | End: 2019-12-19 | Stop reason: HOSPADM

## 2019-12-18 RX ORDER — FAMOTIDINE 10 MG/ML
INJECTION, SOLUTION INTRAVENOUS AS NEEDED
Status: DISCONTINUED | OUTPATIENT
Start: 2019-12-18 | End: 2019-12-18 | Stop reason: SURG

## 2019-12-18 RX ORDER — CETIRIZINE HYDROCHLORIDE 10 MG/1
10 TABLET ORAL DAILY
Status: DISCONTINUED | OUTPATIENT
Start: 2019-12-18 | End: 2019-12-19 | Stop reason: HOSPADM

## 2019-12-18 RX ORDER — MONTELUKAST SODIUM 10 MG/1
10 TABLET ORAL DAILY
Status: DISCONTINUED | OUTPATIENT
Start: 2019-12-18 | End: 2019-12-18

## 2019-12-18 RX ORDER — PROPOFOL 10 MG/ML
VIAL (ML) INTRAVENOUS AS NEEDED
Status: DISCONTINUED | OUTPATIENT
Start: 2019-12-18 | End: 2019-12-18 | Stop reason: SURG

## 2019-12-18 RX ORDER — ROCURONIUM BROMIDE 10 MG/ML
INJECTION, SOLUTION INTRAVENOUS AS NEEDED
Status: DISCONTINUED | OUTPATIENT
Start: 2019-12-18 | End: 2019-12-18 | Stop reason: SURG

## 2019-12-18 RX ORDER — MEPERIDINE HYDROCHLORIDE 25 MG/ML
12.5 INJECTION INTRAMUSCULAR; INTRAVENOUS; SUBCUTANEOUS
Status: DISCONTINUED | OUTPATIENT
Start: 2019-12-18 | End: 2019-12-18 | Stop reason: HOSPADM

## 2019-12-18 RX ORDER — NEOSTIGMINE METHYLSULFATE 1 MG/ML
INJECTION, SOLUTION INTRAVENOUS AS NEEDED
Status: DISCONTINUED | OUTPATIENT
Start: 2019-12-18 | End: 2019-12-18 | Stop reason: SURG

## 2019-12-18 RX ORDER — IPRATROPIUM BROMIDE AND ALBUTEROL SULFATE 2.5; .5 MG/3ML; MG/3ML
3 SOLUTION RESPIRATORY (INHALATION) ONCE AS NEEDED
Status: DISCONTINUED | OUTPATIENT
Start: 2019-12-18 | End: 2019-12-18 | Stop reason: HOSPADM

## 2019-12-18 RX ORDER — LIDOCAINE HYDROCHLORIDE 20 MG/ML
INJECTION, SOLUTION INFILTRATION; PERINEURAL AS NEEDED
Status: DISCONTINUED | OUTPATIENT
Start: 2019-12-18 | End: 2019-12-18 | Stop reason: SURG

## 2019-12-18 RX ORDER — FENTANYL CITRATE 50 UG/ML
50 INJECTION, SOLUTION INTRAMUSCULAR; INTRAVENOUS
Status: DISCONTINUED | OUTPATIENT
Start: 2019-12-18 | End: 2019-12-18 | Stop reason: HOSPADM

## 2019-12-18 RX ORDER — KETOROLAC TROMETHAMINE 30 MG/ML
30 INJECTION, SOLUTION INTRAMUSCULAR; INTRAVENOUS EVERY 6 HOURS PRN
Status: DISCONTINUED | OUTPATIENT
Start: 2019-12-18 | End: 2019-12-19 | Stop reason: HOSPADM

## 2019-12-18 RX ORDER — SODIUM CHLORIDE 9 MG/ML
INJECTION, SOLUTION INTRAVENOUS AS NEEDED
Status: DISCONTINUED | OUTPATIENT
Start: 2019-12-18 | End: 2019-12-18 | Stop reason: HOSPADM

## 2019-12-18 RX ORDER — GLYCOPYRROLATE 0.2 MG/ML
INJECTION INTRAMUSCULAR; INTRAVENOUS AS NEEDED
Status: DISCONTINUED | OUTPATIENT
Start: 2019-12-18 | End: 2019-12-18 | Stop reason: SURG

## 2019-12-18 RX ORDER — ONDANSETRON 2 MG/ML
4 INJECTION INTRAMUSCULAR; INTRAVENOUS EVERY 6 HOURS PRN
Status: DISCONTINUED | OUTPATIENT
Start: 2019-12-18 | End: 2019-12-19 | Stop reason: HOSPADM

## 2019-12-18 RX ORDER — ALBUTEROL SULFATE 90 UG/1
2 AEROSOL, METERED RESPIRATORY (INHALATION) EVERY 4 HOURS PRN
Status: DISCONTINUED | OUTPATIENT
Start: 2019-12-18 | End: 2019-12-19 | Stop reason: HOSPADM

## 2019-12-18 RX ADMIN — HYDROXYCHLOROQUINE SULFATE 200 MG: 200 TABLET ORAL at 21:31

## 2019-12-18 RX ADMIN — ROCURONIUM BROMIDE 10 MG: 10 SOLUTION INTRAVENOUS at 10:47

## 2019-12-18 RX ADMIN — CEFOXITIN 2 G: 2 INJECTION, POWDER, FOR SOLUTION INTRAVENOUS at 10:20

## 2019-12-18 RX ADMIN — IBUPROFEN 800 MG: 800 TABLET, FILM COATED ORAL at 21:31

## 2019-12-18 RX ADMIN — FENTANYL CITRATE 50 MCG: 50 INJECTION INTRAMUSCULAR; INTRAVENOUS at 10:47

## 2019-12-18 RX ADMIN — GLYCOPYRROLATE 0.4 MG: 0.2 INJECTION INTRAMUSCULAR; INTRAVENOUS at 11:20

## 2019-12-18 RX ADMIN — LIDOCAINE HYDROCHLORIDE 60 MG: 20 INJECTION, SOLUTION INFILTRATION; PERINEURAL at 10:18

## 2019-12-18 RX ADMIN — ROCURONIUM BROMIDE 40 MG: 10 SOLUTION INTRAVENOUS at 10:22

## 2019-12-18 RX ADMIN — DEXAMETHASONE SODIUM PHOSPHATE 8 MG: 10 INJECTION INTRAMUSCULAR; INTRAVENOUS at 11:23

## 2019-12-18 RX ADMIN — MEPERIDINE HYDROCHLORIDE 25 MG: 25 INJECTION, SOLUTION INTRAMUSCULAR; INTRAVENOUS; SUBCUTANEOUS at 11:25

## 2019-12-18 RX ADMIN — ONDANSETRON 4 MG: 2 INJECTION INTRAMUSCULAR; INTRAVENOUS at 10:47

## 2019-12-18 RX ADMIN — NEOSTIGMINE METHYLSULFATE 2 MG: 1 INJECTION, SOLUTION INTRAVENOUS at 11:20

## 2019-12-18 RX ADMIN — SODIUM CHLORIDE, POTASSIUM CHLORIDE, SODIUM LACTATE AND CALCIUM CHLORIDE 125 ML/HR: 600; 310; 30; 20 INJECTION, SOLUTION INTRAVENOUS at 15:45

## 2019-12-18 RX ADMIN — FENTANYL CITRATE 50 MCG: 50 INJECTION INTRAMUSCULAR; INTRAVENOUS at 11:14

## 2019-12-18 RX ADMIN — PROPOFOL 150 MG: 10 INJECTION, EMULSION INTRAVENOUS at 10:22

## 2019-12-18 RX ADMIN — SODIUM CHLORIDE, POTASSIUM CHLORIDE, SODIUM LACTATE AND CALCIUM CHLORIDE: 600; 310; 30; 20 INJECTION, SOLUTION INTRAVENOUS at 10:17

## 2019-12-18 RX ADMIN — MONTELUKAST SODIUM 10 MG: 10 TABLET, COATED ORAL at 21:31

## 2019-12-18 RX ADMIN — SCOPALAMINE 1 PATCH: 1 PATCH, EXTENDED RELEASE TRANSDERMAL at 09:12

## 2019-12-18 RX ADMIN — FAMOTIDINE 20 MG: 10 INJECTION INTRAVENOUS at 10:18

## 2019-12-18 RX ADMIN — MIDAZOLAM HYDROCHLORIDE 2 MG: 1 INJECTION, SOLUTION INTRAMUSCULAR; INTRAVENOUS at 10:18

## 2019-12-18 RX ADMIN — PROMETHAZINE HYDROCHLORIDE 12.5 MG: 25 INJECTION INTRAMUSCULAR; INTRAVENOUS at 10:18

## 2019-12-18 RX ADMIN — KETOROLAC TROMETHAMINE 30 MG: 30 INJECTION, SOLUTION INTRAMUSCULAR; INTRAVENOUS at 15:08

## 2019-12-18 RX ADMIN — PROMETHAZINE HYDROCHLORIDE 12.5 MG: 25 INJECTION INTRAMUSCULAR; INTRAVENOUS at 09:12

## 2019-12-18 RX ADMIN — HYDROMORPHONE HYDROCHLORIDE 2 MG: 2 INJECTION, SOLUTION INTRAMUSCULAR; INTRAVENOUS; SUBCUTANEOUS at 10:18

## 2019-12-18 RX ADMIN — OXYCODONE HYDROCHLORIDE AND ACETAMINOPHEN 1 TABLET: 5; 325 TABLET ORAL at 17:01

## 2019-12-18 RX ADMIN — SODIUM CHLORIDE, POTASSIUM CHLORIDE, SODIUM LACTATE AND CALCIUM CHLORIDE 125 ML/HR: 600; 310; 30; 20 INJECTION, SOLUTION INTRAVENOUS at 09:49

## 2019-12-18 NOTE — ANESTHESIA POSTPROCEDURE EVALUATION
Patient: Anna Andres    Procedure Summary     Date:  12/18/19 Room / Location:  [facility] OR  /  [facility] OR    Anesthesia Start:  1019 Anesthesia Stop:  1134    Procedure:  TOTAL LAPAROSCOPIC HYSTERECTOMY BILATERAL SALPIGECTOMY WITH DAVINCI SI ROBOT (N/A Abdomen) Diagnosis:  (D25.9 N93.9)    Surgeon:  Alo Duenas DO Provider:  John Mckee MD    Anesthesia Type:  general ASA Status:  3          Anesthesia Type: general    Vitals  Vitals Value Taken Time   /78 12/18/2019 12:20 PM   Temp 98.1 °F (36.7 °C) 12/18/2019 12:20 PM   Pulse 98 12/18/2019 12:20 PM   Resp 14 12/18/2019 12:20 PM   SpO2 94 % 12/18/2019 12:20 PM           Post Anesthesia Care and Evaluation    Patient location during evaluation: PHASE II  Patient participation: complete - patient participated  Level of consciousness: awake and alert  Pain score: 1  Pain management: adequate  Airway patency: patent  Anesthetic complications: No anesthetic complications  PONV Status: controlled  Cardiovascular status: acceptable  Respiratory status: acceptable  Hydration status: acceptable

## 2019-12-18 NOTE — ANESTHESIA PREPROCEDURE EVALUATION
Anesthesia Evaluation     Patient summary reviewed and Nursing notes reviewed   history of anesthetic complications: PONV  NPO Solid Status: > 8 hours  NPO Liquid Status: > 8 hours           Airway   Mallampati: II  TM distance: >3 FB  Neck ROM: full  no difficulty expected  Dental - normal exam     Pulmonary - normal exam   (+) a smoker Former, asthma,home oxygen (on 2 L/min at night), shortness of breath, sleep apnea on CPAP,   Cardiovascular - normal exam  Exercise tolerance: good (4-7 METS)    NYHA Classification: II    (+) hypertension, valvular problems/murmurs MVP, dysrhythmias,   (-) angina, CHF      Neuro/Psych  (+) headaches, dizziness/light headedness,     GI/Hepatic/Renal/Endo    (+)  GERD,      Musculoskeletal     (+) back pain, chronic pain, myalgias,   Abdominal  - normal exam    Bowel sounds: normal.   Substance History - negative use     OB/GYN negative ob/gyn ROS         Other   arthritis,                    Anesthesia Plan    ASA 3     general     intravenous induction     Anesthetic plan, all risks, benefits, and alternatives have been provided, discussed and informed consent has been obtained with: patient.  Use of blood products discussed with patient  Consented to blood products.

## 2019-12-18 NOTE — ANESTHESIA PROCEDURE NOTES
Airway  Urgency: elective    Date/Time: 12/18/2019 10:25 AM  Airway not difficult    General Information and Staff    Patient location during procedure: OR  Anesthesiologist: John Mckee MD  CRNA: Amanda Leger CRNA    Indications and Patient Condition  Indications for airway management: airway protection    Preoxygenated: yes  MILS maintained throughout  Mask difficulty assessment: 1 - vent by mask    Final Airway Details  Final airway type: endotracheal airway      Successful airway: ETT  Cuffed: yes   Successful intubation technique: direct laryngoscopy  Endotracheal tube insertion site: oral  Blade: Lerner  Blade size: 2  ETT size (mm): 7.0  Cormack-Lehane Classification: grade IIa - partial view of glottis  Placement verified by: chest auscultation   Measured from: lips  ETT/EBT  to lips (cm): 20  Number of attempts at approach: 1    Additional Comments  No trauma or change to dentition

## 2019-12-18 NOTE — OP NOTE
TOTAL LAPAROSCOPIC HYSTERECTOMY WITH DAVINCI SI ROBOT  Procedure Note    Anna Andres  12/18/2019    Pre-op Diagnosis:   Abnormal uterine bleeding  Large fibroid uterus  Pelvic pain    Post-op Diagnosis:     Abnormal uterine bleeding  Large fibroid uterus  Pelvic pain    Procedure(s):  TOTAL LAPAROSCOPIC HYSTERECTOMY BILATERAL SALPIGECTOMY WITH DAVINCI SI ROBOT    Surgeon(s):  Alo Duenas DO    Anesthesia: General    Estimated Blood Loss: minimal    Specimens:                Order Name Source Comment Collection Info Order Time   PREGNANCY, URINE    12/18/2019  8:41 AM   PREGNANCY, URINE    12/18/2019  8:41 AM   TISSUE PATHOLOGY EXAM Uterus with Ovaries and Fallopian Tubes  Collected By: Alo Duenas DO 12/18/2019 11:07 AM         Procedure:  The patient was taken to the operating room after informed written consent was obtained. General anesthesia was induced and the patient was placed in the dorsal lithotomy position. The patient was sterily prepared and draped and a Marin catheter was placed into the bladder. Next the ELIAS manipulator was placed into the uterus with the KOH cup fitting snugly around the cervix. Once this was in place the surgeon was regloved and attention was made to the abdomen.     We made a 1 cm incision above the umbilicus. We inserted an OPTi view trocar into the abdomen under direct visualization using the laparoscope. Next we placed 8mm robotic trochars in the left lower quadrant and another one in the right lower quadrant we placed another trocar between the right-sided port and the midline port. All trochars used were 8mm robotic trochars. We then placed the patient in Trendelenburg and docked the robot. The remainder of the procedure was done under robotic guidance.    First we picked up the left fallopian tube and cauterized under it using the PK forceps and cutting using the scissors. We took the same dissection across the broad ligament and then went  across the utero-ovarian ligament and the round ligament. We did all the cauterizing using the PK forceps and cutting using monopolar scissors. We then took the dissection to the point where the broad ligament divided anteriorly and posteriorly. Anteriorly we took the dissection across to make a bladder flap. We took it posteriorly as well skeletonizing the uterine artery. We then doubly cauterized and cut the uterine artery. We then took the bladder down using sharp dissection and the monopolar scissors. We then took serial bites on each side of the uterus taking the cardinal and the uterosacral ligaments. Once we got all the attachments of the uterus down we made a colpotomy anteriorly and carried it all away around. We then delivered the specimen into the vagina and left it there to keep the pneumoperitoneum. Next we made sure everything was hemostatic. We closed the vagina using a 2-0 monocryl V lock suture making sure to get good bites of the vaginal cuff angles and attaching them to the uterosacral ligaments bilaterally. We then ran the rest of the vaginal cuff taking 1 cm bots of vagina. We then re-approximated the peritoneum over the vaginal cuff using the same suture. We irrigated once again and made sure everything was hemostatic. We them placed some Marcaine into the pelvis for postoperative pain control. We then surveyed the remainder of the abdomen and pelvis and it was grossly normal. We removed the CO2 gas and closed the skin incision with a 4-0 monocryl and placed surgical adhesive on the skin.    Findings: Uterus was enlarged with fibroids.    Complications: none    Grafts or Implants: NA    Alo Duenas DO     Date: 12/18/2019  Time: 12:03 PM

## 2019-12-19 VITALS
RESPIRATION RATE: 16 BRPM | BODY MASS INDEX: 44.59 KG/M2 | DIASTOLIC BLOOD PRESSURE: 50 MMHG | TEMPERATURE: 97.9 F | HEART RATE: 73 BPM | HEIGHT: 64 IN | WEIGHT: 261.2 LBS | OXYGEN SATURATION: 98 % | SYSTOLIC BLOOD PRESSURE: 101 MMHG

## 2019-12-19 LAB
DEPRECATED RDW RBC AUTO: 42.3 FL (ref 37–54)
ERYTHROCYTE [DISTWIDTH] IN BLOOD BY AUTOMATED COUNT: 12.1 % (ref 12.3–15.4)
HCT VFR BLD AUTO: 38.8 % (ref 34–46.6)
HGB BLD-MCNC: 12.7 G/DL (ref 12–15.9)
MCH RBC QN AUTO: 30.8 PG (ref 26.6–33)
MCHC RBC AUTO-ENTMCNC: 32.7 G/DL (ref 31.5–35.7)
MCV RBC AUTO: 93.9 FL (ref 79–97)
PLATELET # BLD AUTO: 228 10*3/MM3 (ref 140–450)
PMV BLD AUTO: 9.6 FL (ref 6–12)
RBC # BLD AUTO: 4.13 10*6/MM3 (ref 3.77–5.28)
WBC NRBC COR # BLD: 10 10*3/MM3 (ref 3.4–10.8)

## 2019-12-19 PROCEDURE — G0378 HOSPITAL OBSERVATION PER HR: HCPCS

## 2019-12-19 PROCEDURE — 25010000002 ENOXAPARIN PER 10 MG: Performed by: OBSTETRICS & GYNECOLOGY

## 2019-12-19 PROCEDURE — 85027 COMPLETE CBC AUTOMATED: CPT | Performed by: OBSTETRICS & GYNECOLOGY

## 2019-12-19 RX ORDER — DOCUSATE CALCIUM 240 MG
240 CAPSULE ORAL DAILY
Qty: 30 CAPSULE | Refills: 1 | Status: SHIPPED | OUTPATIENT
Start: 2019-12-19 | End: 2021-10-04

## 2019-12-19 RX ORDER — OXYCODONE HYDROCHLORIDE AND ACETAMINOPHEN 5; 325 MG/1; MG/1
2 TABLET ORAL EVERY 4 HOURS PRN
Qty: 20 TABLET | Refills: 0 | Status: SHIPPED | OUTPATIENT
Start: 2019-12-19 | End: 2020-01-10

## 2019-12-19 RX ORDER — IBUPROFEN 600 MG/1
600 TABLET ORAL EVERY 6 HOURS PRN
Qty: 30 TABLET | Refills: 0 | Status: SHIPPED | OUTPATIENT
Start: 2019-12-19 | End: 2020-01-18

## 2019-12-19 RX ADMIN — HYDROXYCHLOROQUINE SULFATE 200 MG: 200 TABLET ORAL at 08:58

## 2019-12-19 RX ADMIN — CETIRIZINE HYDROCHLORIDE 10 MG: 10 TABLET, FILM COATED ORAL at 08:58

## 2019-12-19 RX ADMIN — ENOXAPARIN SODIUM 40 MG: 40 INJECTION SUBCUTANEOUS at 08:58

## 2019-12-19 RX ADMIN — PANTOPRAZOLE SODIUM 40 MG: 40 TABLET, DELAYED RELEASE ORAL at 06:17

## 2019-12-19 RX ADMIN — DULOXETINE HYDROCHLORIDE 60 MG: 60 CAPSULE, DELAYED RELEASE ORAL at 08:58

## 2019-12-19 RX ADMIN — IBUPROFEN 800 MG: 800 TABLET, FILM COATED ORAL at 06:17

## 2019-12-19 NOTE — DISCHARGE SUMMARY
Date of Discharge:  12/19/2019    Discharge Diagnosis:   Abnormal uterine bleeding  Pelvic pain  Large fibroid uterus    Problem List:    Abnormal uterine bleeding (AUB)      Presenting Problem/History of Present Illness  Abnormal uterine bleeding (AUB) [N93.9]      Hospital Course  Patient is a 44 y.o. female presented with the above diagnosis and underwent elective surgery.  She did well.     Procedures Performed  Procedure(s):  TOTAL LAPAROSCOPIC HYSTERECTOMY BILATERAL SALPIGECTOMY WITH DAVINCI SI ROBOT       Consults:   Consults     No orders found for last 30 day(s).          Pertinent Test Results:    Condition on Discharge: Stable  Vital Signs  Temp:  [97 °F (36.1 °C)-98.7 °F (37.1 °C)] 98.7 °F (37.1 °C)  Heart Rate:  [66-98] 81  Resp:  [11-25] 18  BP: ()/(50-83) 117/56    Discharge Disposition      Discharge Medications     Discharge Medications      ASK your doctor about these medications      Instructions Start Date   albuterol sulfate  (90 Base) MCG/ACT inhaler  Commonly known as:  PROVENTIL HFA;VENTOLIN HFA;PROAIR HFA   2 puffs, Inhalation, Every 4 Hours PRN      amoxicillin-clavulanate 875-125 MG per tablet  Commonly known as:  AUGMENTIN   1 tablet, Oral, 2 Times Daily      azelastine 0.1 % nasal spray  Commonly known as:  ASTELIN   2 sprays, Nasal, 2 Times Daily PRN      CLARITIN 10 MG tablet  Generic drug:  loratadine   10 mg, Oral, Daily      clobetasol 0.05 % external solution  Commonly known as:  TEMOVATE   1 application, Topical, 2 Times Daily      Clocortolone Pivalate 0.1 % cream   1 application, Topical, 2 Times Daily PRN      coenzyme Q10 100 MG capsule   100 mg, Oral, Daily      cyanocobalamin 1000 MCG/ML injection   1,000 mcg, Subcutaneous, Every 30 Days      diclofenac 3 % gel gel  Commonly known as:  VOLTAREN   4 g, Topical, 2 Times Daily PRN      DUEXIS 800-26.6 MG tablet  Generic drug:  Ibuprofen-Famotidine   1 tablet, Oral, Daily PRN      DULoxetine 60 MG  capsule  Commonly known as:  CYMBALTA   60 mg, Oral, Daily      fish oil 1000 MG capsule capsule   1,000 mg, Oral, Daily With Breakfast      fluticasone 50 MCG/ACT nasal spray  Commonly known as:  FLONASE   2 sprays, Nasal, Daily PRN      hydroxychloroquine 200 MG tablet  Commonly known as:  PLAQUENIL   200 mg, Oral, 2 Times Daily      montelukast 10 MG tablet  Commonly known as:  SINGULAIR   10 mg, Oral, Daily      multivitamin tablet tablet   1 tablet, Oral, Daily      naratriptan 2.5 MG tablet  Commonly known as:  AMERGE   2.5 mg, Oral, Daily PRN      pantoprazole 40 MG EC tablet  Commonly known as:  PROTONIX   40 mg, Oral, Daily PRN      pimecrolimus 1 % cream  Commonly known as:  ELIDEL   1 application, Topical, 2 Times Daily      PULMICORT FLEXHALER 180 MCG/ACT inhaler  Generic drug:  budesonide   1-2 puffs, Inhalation, Daily      RHOFADE 1 % cream  Generic drug:  Oxymetazoline HCl   1 application, Apply externally, Daily PRN      SERNIVO 0.05 % emulsion  Generic drug:  Betamethasone Dipropionate   1 application, Apply externally, Daily PRN      SOOLANTRA 1 % cream  Generic drug:  Ivermectin   1 application, Apply externally, Nightly      triamcinolone 0.1 % cream  Commonly known as:  KENALOG   1 application, Topical, 2 Times Daily PRN      vitamin D 1.25 MG (08419 UT) capsule capsule  Commonly known as:  ERGOCALCIFEROL   50,000 Units, Oral, Weekly             Discharge Diet       Activity at Discharge      Follow-up Appointments  Future Appointments   Date Time Provider Department Center   1/9/2020 12:00 PM Milton Trotter MD [facility] None           Time: Discharge 15 min

## 2019-12-19 NOTE — PLAN OF CARE
Problem: Patient Care Overview  Goal: Plan of Care Review  12/19/2019 0435 by Ct Jenkins RN  Outcome: Ongoing (interventions implemented as appropriate)  Flowsheets (Taken 12/19/2019 0435)  Progress: improving  Plan of Care Reviewed With: patient  Outcome Summary: FC removed, fluids stopped. pt tolerating po well. pain controlled.

## 2019-12-23 LAB
LAB AP CASE REPORT: NORMAL
PATH REPORT.FINAL DX SPEC: NORMAL

## 2020-01-09 ENCOUNTER — OFFICE VISIT (OUTPATIENT)
Dept: CARDIOLOGY | Facility: CLINIC | Age: 45
End: 2020-01-09

## 2020-01-09 ENCOUNTER — PREP FOR SURGERY (OUTPATIENT)
Dept: OTHER | Facility: HOSPITAL | Age: 45
End: 2020-01-09

## 2020-01-09 VITALS
BODY MASS INDEX: 44.22 KG/M2 | RESPIRATION RATE: 16 BRPM | SYSTOLIC BLOOD PRESSURE: 119 MMHG | HEART RATE: 75 BPM | HEIGHT: 64 IN | WEIGHT: 259 LBS | DIASTOLIC BLOOD PRESSURE: 74 MMHG

## 2020-01-09 DIAGNOSIS — R55 NEAR SYNCOPE: Primary | ICD-10-CM

## 2020-01-09 DIAGNOSIS — Z45.09 ENCOUNTER FOR LOOP RECORDER AT END OF BATTERY LIFE: Primary | ICD-10-CM

## 2020-01-09 DIAGNOSIS — Z45.09 ENCOUNTER FOR LOOP RECORDER AT END OF BATTERY LIFE: ICD-10-CM

## 2020-01-09 PROCEDURE — 99213 OFFICE O/P EST LOW 20 MIN: CPT | Performed by: INTERNAL MEDICINE

## 2020-01-09 RX ORDER — SODIUM CHLORIDE 0.9 % (FLUSH) 0.9 %
10 SYRINGE (ML) INJECTION AS NEEDED
Status: CANCELLED | OUTPATIENT
Start: 2020-01-09

## 2020-01-09 NOTE — H&P (VIEW-ONLY)
"Rajesh Palmer MD  Anna Andres  1975 01/09/2020    Patient Active Problem List   Diagnosis   • Gougerout-Sjoegren syndrome   • Breath shortness   • Acute bronchitis   • Heart palpitations   • Dizziness   • Near syncope   • Abnormal uterine bleeding (AUB)   • Encounter for loop recorder at end of battery life       Dear :    Subjective     Anna Andres is a 44 y.o. female with the problems as listed above, presents    Chief complaint: Follow-up for history of syncope.    History of Present Illness: Ms. Andres is a pleasant 40-year-old  female with history of dizziness and near syncope in the past for which she has had an internal loop recorder implanted in 2015.  She has not had any more episodes of significant dizziness or near syncope or syncope in the last year and 1/2 to 2 years.  She was noted to have the battery depletion of the loop recorder on the previous check.  She is here for regular cardiology follow-up.  She denies any further episodes of significant dizziness or syncope.        Allergies   Allergen Reactions   • Avelox [Moxifloxacin] Other (See Comments)     Severe leg pains   • Levaquin [Levofloxacin] Other (See Comments)     Severe leg pains   • Meloxicam Palpitations   • Quinolones Other (See Comments)     Severe leg pains   • Metaxalone Other (See Comments)     \"skelaxin\"  Flu like symptoms   • Bupropion Hcl Rash     \"zyban\"   • Celecoxib Rash   :      Current Outpatient Medications:   •  albuterol (PROVENTIL HFA;VENTOLIN HFA) 108 (90 BASE) MCG/ACT inhaler, Inhale 2 puffs every 4 (four) hours as needed for wheezing., Disp: , Rfl:   •  azelastine (ASTELIN) 0.1 % nasal spray, 2 sprays into the nostril(s) as directed by provider 2 (Two) Times a Day As Needed for Rhinitis or Allergies., Disp: , Rfl:   •  Betamethasone Dipropionate (SERNIVO) 0.05 % emulsion, Apply 1 application topically Daily As Needed., Disp: , Rfl:   •  clobetasol (TEMOVATE) 0.05 % external " solution, Apply 1 application topically to the appropriate area as directed 2 (Two) Times a Day., Disp: , Rfl:   •  Clocortolone Pivalate 0.1 % cream, Apply 1 application topically to the appropriate area as directed 2 (Two) Times a Day As Needed., Disp: , Rfl:   •  coenzyme Q10 100 MG capsule, Take 100 mg by mouth Daily., Disp: , Rfl:   •  cyanocobalamin 1000 MCG/ML injection, Inject 1,000 mcg under the skin into the appropriate area as directed Every 30 (Thirty) Days., Disp: , Rfl: 3  •  diclofenac (VOLTAREN) 3 % gel gel, Apply 4 g topically to the appropriate area as directed 2 (Two) Times a Day As Needed., Disp: , Rfl:   •  docusate calcium (SURFAK) 240 MG capsule, Take 1 capsule by mouth Daily., Disp: 30 capsule, Rfl: 1  •  DULoxetine (CYMBALTA) 60 MG capsule, Take 60 mg by mouth Daily., Disp: , Rfl: 1  •  fluticasone (FLONASE) 50 MCG/ACT nasal spray, 2 sprays into the nostril(s) as directed by provider Daily As Needed for Rhinitis or Allergies., Disp: , Rfl:   •  hydroxychloroquine (PLAQUENIL) 200 MG tablet, Take 200 mg by mouth 2 (Two) Times a Day., Disp: , Rfl: 1  •  ibuprofen (ADVIL,MOTRIN) 600 MG tablet, Take 1 tablet by mouth Every 6 (Six) Hours As Needed for Mild Pain  for up to 30 days., Disp: 30 tablet, Rfl: 0  •  Ibuprofen-Famotidine (DUEXIS) 800-26.6 MG tablet, Take 1 tablet by mouth Daily As Needed., Disp: , Rfl:   •  Ivermectin (SOOLANTRA) 1 % cream, Apply 1 application topically Every Night., Disp: , Rfl:   •  loratadine (CLARITIN) 10 MG tablet, Take 10 mg by mouth Daily., Disp: , Rfl:   •  montelukast (SINGULAIR) 10 MG tablet, Take 10 mg by mouth Daily., Disp: , Rfl: 3  •  multivitamin (DAILY RON) tablet tablet, Take 1 tablet by mouth Daily., Disp: , Rfl:   •  naratriptan (AMERGE) 2.5 MG tablet, Take 2.5 mg by mouth Daily As Needed for Headache or Migraine., Disp: , Rfl: 5  •  Omega-3 Fatty Acids (FISH OIL) 1000 MG capsule capsule, Take 1,000 mg by mouth Daily With Breakfast., Disp: , Rfl:   •   "Oxymetazoline HCl (RHOFADE) 1 % cream, Apply 1 application topically Daily As Needed., Disp: , Rfl:   •  pantoprazole (PROTONIX) 40 MG EC tablet, Take 40 mg by mouth Daily As Needed., Disp: , Rfl: 3  •  pimecrolimus (ELIDEL) 1 % cream, Apply 1 application topically to the appropriate area as directed 2 (Two) Times a Day., Disp: , Rfl:   •  PULMICORT FLEXHALER 180 MCG/ACT inhaler, Inhale 1-2 puffs Daily., Disp: , Rfl: 3  •  triamcinolone (KENALOG) 0.1 % cream, Apply 1 application topically to the appropriate area as directed 2 (Two) Times a Day As Needed for Irritation or Rash., Disp: , Rfl:   •  vitamin D (ERGOCALCIFEROL) 48025 UNITS capsule capsule, Take 50,000 Units by mouth 1 (One) Time Per Week., Disp: , Rfl: 4  •  oxyCODONE-acetaminophen (PERCOCET) 5-325 MG per tablet, Take 2 tablets by mouth Every 4 (Four) Hours As Needed for Severe Pain ., Disp: 20 tablet, Rfl: 0      The following portions of the patient's history were reviewed and updated as appropriate: allergies, current medications, past family history, past medical history, past social history, past surgical history and problem list.    Social History     Tobacco Use   • Smoking status: Former Smoker     Packs/day: 0.50     Years: 10.00     Pack years: 5.00     Types: Cigarettes     Last attempt to quit: 2011     Years since quittin.0   • Smokeless tobacco: Never Used   Substance Use Topics   • Alcohol use: No   • Drug use: No       Review of Systems   Cardiovascular: Positive for leg swelling. Negative for chest pain, palpitations and syncope.   Respiratory: Negative for shortness of breath.    Neurological: Negative for dizziness.       Objective   Vitals:    20 1147   BP: 119/74   BP Location: Right arm   Pulse: 75   Resp: 16   Weight: 117 kg (259 lb)   Height: 162.6 cm (64.02\")     Body mass index is 44.44 kg/m².        Physical Exam   Constitutional: She is oriented to person, place, and time. She appears well-developed and " well-nourished.   HENT:   Head: Normocephalic.   Eyes: Conjunctivae and EOM are normal.   Neck: Normal range of motion. Neck supple. No JVD present. No tracheal deviation present. No thyromegaly present.   Cardiovascular: Normal rate and regular rhythm. Exam reveals no gallop and no friction rub.   No murmur heard.  Pulmonary/Chest: Breath sounds normal. No respiratory distress. She has no wheezes. She has no rales.   Abdominal: Soft. Bowel sounds are normal. She exhibits no mass. There is no tenderness.   Musculoskeletal: She exhibits no edema.   Neurological: She is alert and oriented to person, place, and time. No cranial nerve deficit.   Skin: Skin is warm and dry.   Psychiatric: She has a normal mood and affect.       Lab Results   Component Value Date     12/17/2019    K 4.1 12/17/2019    CL 99 12/17/2019    CO2 28.1 12/17/2019    BUN 18 12/17/2019    CREATININE 0.83 12/17/2019    GLUCOSE 88 12/17/2019    CALCIUM 9.9 12/17/2019    AST 19 12/10/2019    ALT 24 12/10/2019    ALKPHOS 52 12/10/2019    LABIL2 1.4 (L) 06/11/2015     Lab Results   Component Value Date    CKTOTAL 172 12/01/2016     Lab Results   Component Value Date    WBC 10.00 12/19/2019    HGB 12.7 12/19/2019    HCT 38.8 12/19/2019     12/19/2019     Lab Results   Component Value Date    INR <0.90 05/13/2014     Lab Results   Component Value Date    MG 2.0 12/01/2016     Lab Results   Component Value Date    TSH 3.020 12/10/2019    CHLPL 222 (H) 06/11/2015    TRIG 79 12/10/2019    HDL 53 12/10/2019     (H) 12/10/2019      Lab Results   Component Value Date    BNP 16.0 12/01/2016       Assessment/Plan    Diagnosis Plan   1. Near syncope with no recurrence in the last year and 1/2 to 2 years.     2. Encounter for loop recorder at end of battery life            Recommendations:  Since she has been doing well with no further episodes of dizziness syncope and with her loop recorder having reached end-of-life, will plan to get this  explanted in the next few weeks.    Return in about 7 months (around 8/9/2020).    As always,Luis I appreciate very much the opportunity to participate in the cardiovascular care of your patients.      With Best Regards,    Milton Trotter MD, [facility]    Dragon disclaimer:  Much of this encounter note is an electronic transcription/translation of spoken language to printed text. The electronic translation of spoken language may permit erroneous, or at times, nonsensical words or phrases to be inadvertently transcribed; Although I have reviewed the note for such errors, some may still exist.

## 2020-01-09 NOTE — PROGRESS NOTES
"Rajesh Palmer MD  Anna Andres  1975 01/09/2020    Patient Active Problem List   Diagnosis   • Gougerout-Sjoegren syndrome   • Breath shortness   • Acute bronchitis   • Heart palpitations   • Dizziness   • Near syncope   • Abnormal uterine bleeding (AUB)   • Encounter for loop recorder at end of battery life       Dear :    Subjective     Anna Andres is a 44 y.o. female with the problems as listed above, presents    Chief complaint: Follow-up for history of syncope.    History of Present Illness: Ms. Andres is a pleasant 40-year-old  female with history of dizziness and near syncope in the past for which she has had an internal loop recorder implanted in 2015.  She has not had any more episodes of significant dizziness or near syncope or syncope in the last year and 1/2 to 2 years.  She was noted to have the battery depletion of the loop recorder on the previous check.  She is here for regular cardiology follow-up.  She denies any further episodes of significant dizziness or syncope.        Allergies   Allergen Reactions   • Avelox [Moxifloxacin] Other (See Comments)     Severe leg pains   • Levaquin [Levofloxacin] Other (See Comments)     Severe leg pains   • Meloxicam Palpitations   • Quinolones Other (See Comments)     Severe leg pains   • Metaxalone Other (See Comments)     \"skelaxin\"  Flu like symptoms   • Bupropion Hcl Rash     \"zyban\"   • Celecoxib Rash   :      Current Outpatient Medications:   •  albuterol (PROVENTIL HFA;VENTOLIN HFA) 108 (90 BASE) MCG/ACT inhaler, Inhale 2 puffs every 4 (four) hours as needed for wheezing., Disp: , Rfl:   •  azelastine (ASTELIN) 0.1 % nasal spray, 2 sprays into the nostril(s) as directed by provider 2 (Two) Times a Day As Needed for Rhinitis or Allergies., Disp: , Rfl:   •  Betamethasone Dipropionate (SERNIVO) 0.05 % emulsion, Apply 1 application topically Daily As Needed., Disp: , Rfl:   •  clobetasol (TEMOVATE) 0.05 % external " solution, Apply 1 application topically to the appropriate area as directed 2 (Two) Times a Day., Disp: , Rfl:   •  Clocortolone Pivalate 0.1 % cream, Apply 1 application topically to the appropriate area as directed 2 (Two) Times a Day As Needed., Disp: , Rfl:   •  coenzyme Q10 100 MG capsule, Take 100 mg by mouth Daily., Disp: , Rfl:   •  cyanocobalamin 1000 MCG/ML injection, Inject 1,000 mcg under the skin into the appropriate area as directed Every 30 (Thirty) Days., Disp: , Rfl: 3  •  diclofenac (VOLTAREN) 3 % gel gel, Apply 4 g topically to the appropriate area as directed 2 (Two) Times a Day As Needed., Disp: , Rfl:   •  docusate calcium (SURFAK) 240 MG capsule, Take 1 capsule by mouth Daily., Disp: 30 capsule, Rfl: 1  •  DULoxetine (CYMBALTA) 60 MG capsule, Take 60 mg by mouth Daily., Disp: , Rfl: 1  •  fluticasone (FLONASE) 50 MCG/ACT nasal spray, 2 sprays into the nostril(s) as directed by provider Daily As Needed for Rhinitis or Allergies., Disp: , Rfl:   •  hydroxychloroquine (PLAQUENIL) 200 MG tablet, Take 200 mg by mouth 2 (Two) Times a Day., Disp: , Rfl: 1  •  ibuprofen (ADVIL,MOTRIN) 600 MG tablet, Take 1 tablet by mouth Every 6 (Six) Hours As Needed for Mild Pain  for up to 30 days., Disp: 30 tablet, Rfl: 0  •  Ibuprofen-Famotidine (DUEXIS) 800-26.6 MG tablet, Take 1 tablet by mouth Daily As Needed., Disp: , Rfl:   •  Ivermectin (SOOLANTRA) 1 % cream, Apply 1 application topically Every Night., Disp: , Rfl:   •  loratadine (CLARITIN) 10 MG tablet, Take 10 mg by mouth Daily., Disp: , Rfl:   •  montelukast (SINGULAIR) 10 MG tablet, Take 10 mg by mouth Daily., Disp: , Rfl: 3  •  multivitamin (DAILY RON) tablet tablet, Take 1 tablet by mouth Daily., Disp: , Rfl:   •  naratriptan (AMERGE) 2.5 MG tablet, Take 2.5 mg by mouth Daily As Needed for Headache or Migraine., Disp: , Rfl: 5  •  Omega-3 Fatty Acids (FISH OIL) 1000 MG capsule capsule, Take 1,000 mg by mouth Daily With Breakfast., Disp: , Rfl:   •   "Oxymetazoline HCl (RHOFADE) 1 % cream, Apply 1 application topically Daily As Needed., Disp: , Rfl:   •  pantoprazole (PROTONIX) 40 MG EC tablet, Take 40 mg by mouth Daily As Needed., Disp: , Rfl: 3  •  pimecrolimus (ELIDEL) 1 % cream, Apply 1 application topically to the appropriate area as directed 2 (Two) Times a Day., Disp: , Rfl:   •  PULMICORT FLEXHALER 180 MCG/ACT inhaler, Inhale 1-2 puffs Daily., Disp: , Rfl: 3  •  triamcinolone (KENALOG) 0.1 % cream, Apply 1 application topically to the appropriate area as directed 2 (Two) Times a Day As Needed for Irritation or Rash., Disp: , Rfl:   •  vitamin D (ERGOCALCIFEROL) 29918 UNITS capsule capsule, Take 50,000 Units by mouth 1 (One) Time Per Week., Disp: , Rfl: 4  •  oxyCODONE-acetaminophen (PERCOCET) 5-325 MG per tablet, Take 2 tablets by mouth Every 4 (Four) Hours As Needed for Severe Pain ., Disp: 20 tablet, Rfl: 0      The following portions of the patient's history were reviewed and updated as appropriate: allergies, current medications, past family history, past medical history, past social history, past surgical history and problem list.    Social History     Tobacco Use   • Smoking status: Former Smoker     Packs/day: 0.50     Years: 10.00     Pack years: 5.00     Types: Cigarettes     Last attempt to quit: 2011     Years since quittin.0   • Smokeless tobacco: Never Used   Substance Use Topics   • Alcohol use: No   • Drug use: No       Review of Systems   Cardiovascular: Positive for leg swelling. Negative for chest pain, palpitations and syncope.   Respiratory: Negative for shortness of breath.    Neurological: Negative for dizziness.       Objective   Vitals:    20 1147   BP: 119/74   BP Location: Right arm   Pulse: 75   Resp: 16   Weight: 117 kg (259 lb)   Height: 162.6 cm (64.02\")     Body mass index is 44.44 kg/m².        Physical Exam   Constitutional: She is oriented to person, place, and time. She appears well-developed and " well-nourished.   HENT:   Head: Normocephalic.   Eyes: Conjunctivae and EOM are normal.   Neck: Normal range of motion. Neck supple. No JVD present. No tracheal deviation present. No thyromegaly present.   Cardiovascular: Normal rate and regular rhythm. Exam reveals no gallop and no friction rub.   No murmur heard.  Pulmonary/Chest: Breath sounds normal. No respiratory distress. She has no wheezes. She has no rales.   Abdominal: Soft. Bowel sounds are normal. She exhibits no mass. There is no tenderness.   Musculoskeletal: She exhibits no edema.   Neurological: She is alert and oriented to person, place, and time. No cranial nerve deficit.   Skin: Skin is warm and dry.   Psychiatric: She has a normal mood and affect.       Lab Results   Component Value Date     12/17/2019    K 4.1 12/17/2019    CL 99 12/17/2019    CO2 28.1 12/17/2019    BUN 18 12/17/2019    CREATININE 0.83 12/17/2019    GLUCOSE 88 12/17/2019    CALCIUM 9.9 12/17/2019    AST 19 12/10/2019    ALT 24 12/10/2019    ALKPHOS 52 12/10/2019    LABIL2 1.4 (L) 06/11/2015     Lab Results   Component Value Date    CKTOTAL 172 12/01/2016     Lab Results   Component Value Date    WBC 10.00 12/19/2019    HGB 12.7 12/19/2019    HCT 38.8 12/19/2019     12/19/2019     Lab Results   Component Value Date    INR <0.90 05/13/2014     Lab Results   Component Value Date    MG 2.0 12/01/2016     Lab Results   Component Value Date    TSH 3.020 12/10/2019    CHLPL 222 (H) 06/11/2015    TRIG 79 12/10/2019    HDL 53 12/10/2019     (H) 12/10/2019      Lab Results   Component Value Date    BNP 16.0 12/01/2016       Assessment/Plan    Diagnosis Plan   1. Near syncope with no recurrence in the last year and 1/2 to 2 years.     2. Encounter for loop recorder at end of battery life            Recommendations:  Since she has been doing well with no further episodes of dizziness syncope and with her loop recorder having reached end-of-life, will plan to get this  explanted in the next few weeks.    Return in about 7 months (around 8/9/2020).    As always,Luis I appreciate very much the opportunity to participate in the cardiovascular care of your patients.      With Best Regards,    Milton Trotter MD, [facility]    Dragon disclaimer:  Much of this encounter note is an electronic transcription/translation of spoken language to printed text. The electronic translation of spoken language may permit erroneous, or at times, nonsensical words or phrases to be inadvertently transcribed; Although I have reviewed the note for such errors, some may still exist.

## 2020-01-14 ENCOUNTER — HOSPITAL ENCOUNTER (OUTPATIENT)
Facility: HOSPITAL | Age: 45
Setting detail: HOSPITAL OUTPATIENT SURGERY
Discharge: HOME | End: 2020-01-14
Attending: INTERNAL MEDICINE | Admitting: INTERNAL MEDICINE

## 2020-01-14 VITALS
SYSTOLIC BLOOD PRESSURE: 125 MMHG | BODY MASS INDEX: 43.87 KG/M2 | HEIGHT: 64 IN | OXYGEN SATURATION: 100 % | DIASTOLIC BLOOD PRESSURE: 84 MMHG | RESPIRATION RATE: 16 BRPM | HEART RATE: 65 BPM | WEIGHT: 257 LBS | TEMPERATURE: 98.6 F

## 2020-01-14 DIAGNOSIS — Z45.09 ENCOUNTER FOR LOOP RECORDER AT END OF BATTERY LIFE: ICD-10-CM

## 2020-01-14 PROCEDURE — 33286 RMVL SUBQ CAR RHYTHM MNTR: CPT | Performed by: INTERNAL MEDICINE

## 2020-01-14 RX ORDER — LIDOCAINE HYDROCHLORIDE 20 MG/ML
INJECTION, SOLUTION INFILTRATION; PERINEURAL AS NEEDED
Status: DISCONTINUED | OUTPATIENT
Start: 2020-01-14 | End: 2020-01-14 | Stop reason: HOSPADM

## 2020-01-14 NOTE — DISCHARGE INSTRUCTIONS
Continue current medications as prescribed.  Leave dressing to site in place until you follow up at Dr. Trotter's office in ONE week.  You may take baths, but do not take showers until your follow up.

## 2020-01-14 NOTE — DISCHARGE INSTR - APPOINTMENTS
Go to Dr. Trotter's office in ONE week for a wound check.    You are not required to have an appointment for this visit.  The best time to go to his office for the wound check is between 8 am and noon.

## 2020-08-13 ENCOUNTER — OFFICE VISIT (OUTPATIENT)
Dept: CARDIOLOGY | Facility: CLINIC | Age: 45
End: 2020-08-13

## 2020-08-13 VITALS
WEIGHT: 250 LBS | OXYGEN SATURATION: 99 % | SYSTOLIC BLOOD PRESSURE: 124 MMHG | TEMPERATURE: 97.4 F | BODY MASS INDEX: 42.68 KG/M2 | HEART RATE: 77 BPM | HEIGHT: 64 IN | RESPIRATION RATE: 16 BRPM | DIASTOLIC BLOOD PRESSURE: 84 MMHG

## 2020-08-13 DIAGNOSIS — R00.2 HEART PALPITATIONS: Primary | ICD-10-CM

## 2020-08-13 PROCEDURE — 93000 ELECTROCARDIOGRAM COMPLETE: CPT | Performed by: PHYSICIAN ASSISTANT

## 2020-08-13 PROCEDURE — 99213 OFFICE O/P EST LOW 20 MIN: CPT | Performed by: PHYSICIAN ASSISTANT

## 2020-08-13 NOTE — PROGRESS NOTES
"Rajesh Palmer MD  Anna Andres  1975 08/13/2020    Patient Active Problem List   Diagnosis   • Gougerout-Sjoegren syndrome   • Breath shortness   • Acute bronchitis   • Heart palpitations   • Dizziness   • Near syncope   • Abnormal uterine bleeding (AUB)   • Encounter for loop recorder at end of battery life       Dear Rajesh Palmer MD:    Subjective     History of Present Illness:    Chief Complaint   Patient presents with   • Follow-up     7 month   • Med Management     list       Anna Andres is a pleasant 45 y.o. female with a past medical history significant for heart palpitations with subsequent loop recorder implantation that was recently removed.  She comes in for routine cardiology follow-up.    Patient reports that she has been doing well from cardiac standpoint.  She does deny any chest pains or shortness of breath worsening from her baseline.  She does still report some occasional palpitations however for the 5-year she did wear her loop recorder no dysrhythmias were seen.  She denies any syncope or near syncope.    Allergies   Allergen Reactions   • Avelox [Moxifloxacin] Other (See Comments)     Severe leg pains   • Levaquin [Levofloxacin] Other (See Comments)     Severe leg pains   • Meloxicam Palpitations   • Quinolones Other (See Comments)     Severe leg pains   • Metaxalone Other (See Comments)     \"skelaxin\"  Flu like symptoms   • Bupropion Hcl Rash     \"zyban\"   • Celecoxib Rash   :      Current Outpatient Medications:   •  albuterol (PROVENTIL HFA;VENTOLIN HFA) 108 (90 BASE) MCG/ACT inhaler, Inhale 2 puffs every 4 (four) hours as needed for wheezing., Disp: , Rfl:   •  azelastine (ASTELIN) 0.1 % nasal spray, 2 sprays into the nostril(s) as directed by provider 2 (Two) Times a Day As Needed for Rhinitis or Allergies., Disp: , Rfl:   •  Betamethasone Dipropionate (SERNIVO) 0.05 % emulsion, Apply 1 application topically Daily As Needed., Disp: , Rfl:   •  clobetasol " (TEMOVATE) 0.05 % external solution, Apply 1 application topically to the appropriate area as directed 2 (Two) Times a Day., Disp: , Rfl:   •  Clocortolone Pivalate 0.1 % cream, Apply 1 application topically to the appropriate area as directed 2 (Two) Times a Day As Needed., Disp: , Rfl:   •  coenzyme Q10 100 MG capsule, Take 100 mg by mouth Daily., Disp: , Rfl:   •  cyanocobalamin 1000 MCG/ML injection, Inject 1,000 mcg under the skin into the appropriate area as directed Every 30 (Thirty) Days., Disp: , Rfl: 3  •  DULoxetine (CYMBALTA) 60 MG capsule, Take 60 mg by mouth Daily., Disp: , Rfl: 1  •  fluticasone (FLONASE) 50 MCG/ACT nasal spray, 2 sprays into the nostril(s) as directed by provider Daily As Needed for Rhinitis or Allergies., Disp: , Rfl:   •  hydroxychloroquine (PLAQUENIL) 200 MG tablet, Take 200 mg by mouth 2 (Two) Times a Day., Disp: , Rfl: 1  •  Ibuprofen-Famotidine (DUEXIS) 800-26.6 MG tablet, Take 1 tablet by mouth Daily As Needed., Disp: , Rfl:   •  loratadine (CLARITIN) 10 MG tablet, Take 10 mg by mouth Daily., Disp: , Rfl:   •  montelukast (SINGULAIR) 10 MG tablet, Take 10 mg by mouth Daily., Disp: , Rfl: 3  •  multivitamin (DAILY RON) tablet tablet, Take 1 tablet by mouth Daily., Disp: , Rfl:   •  naratriptan (AMERGE) 2.5 MG tablet, Take 2.5 mg by mouth Daily As Needed for Headache or Migraine., Disp: , Rfl: 5  •  Omega-3 Fatty Acids (FISH OIL) 1000 MG capsule capsule, Take 1,000 mg by mouth Daily With Breakfast., Disp: , Rfl:   •  pantoprazole (PROTONIX) 40 MG EC tablet, Take 40 mg by mouth Daily As Needed., Disp: , Rfl: 3  •  PULMICORT FLEXHALER 180 MCG/ACT inhaler, Inhale 1-2 puffs Daily., Disp: , Rfl: 3  •  vitamin D (ERGOCALCIFEROL) 73273 UNITS capsule capsule, Take 50,000 Units by mouth 1 (One) Time Per Week., Disp: , Rfl: 4  •  diclofenac (VOLTAREN) 3 % gel gel, Apply 4 g topically to the appropriate area as directed 2 (Two) Times a Day As Needed., Disp: , Rfl:   •  docusate calcium  "(SURFAK) 240 MG capsule, Take 1 capsule by mouth Daily., Disp: 30 capsule, Rfl: 1  •  Ivermectin (SOOLANTRA) 1 % cream, Apply 1 application topically Every Night., Disp: , Rfl:   •  Oxymetazoline HCl (RHOFADE) 1 % cream, Apply 1 application topically Daily As Needed., Disp: , Rfl:   •  pimecrolimus (ELIDEL) 1 % cream, Apply 1 application topically to the appropriate area as directed 2 (Two) Times a Day., Disp: , Rfl:   •  triamcinolone (KENALOG) 0.1 % cream, Apply 1 application topically to the appropriate area as directed 2 (Two) Times a Day As Needed for Irritation or Rash., Disp: , Rfl:     The following portions of the patient's history were reviewed and updated as appropriate: allergies, current medications, past family history, past medical history, past social history, past surgical history and problem list.    Social History     Tobacco Use   • Smoking status: Former Smoker     Packs/day: 0.50     Years: 10.00     Pack years: 5.00     Types: Cigarettes     Last attempt to quit:      Years since quittin.6   • Smokeless tobacco: Never Used   Substance Use Topics   • Alcohol use: No   • Drug use: No       Review of Systems   Constitution: Negative for malaise/fatigue.   Cardiovascular: Negative for chest pain, dyspnea on exertion and irregular heartbeat.   Respiratory: Negative for cough and shortness of breath.    Hematologic/Lymphatic: Negative for bleeding problem. Does not bruise/bleed easily.   Gastrointestinal: Negative for nausea and vomiting.   Neurological: Negative for weakness.       Objective   Vitals:    20 0816   BP: 124/84   BP Location: Left arm   Patient Position: Sitting   Cuff Size: Large Adult   Pulse: 77   Resp: 16   Temp: 97.4 °F (36.3 °C)   TempSrc: Temporal   SpO2: 99%   Weight: 113 kg (250 lb)   Height: 162.6 cm (64.02\")     Body mass index is 42.89 kg/m².    Physical Exam   Constitutional: She is oriented to person, place, and time. She appears well-developed and " well-nourished. No distress.   HENT:   Head: Normocephalic and atraumatic.   Cardiovascular: Normal rate, regular rhythm and normal heart sounds.   Pulmonary/Chest: Effort normal and breath sounds normal. No respiratory distress.   Musculoskeletal: She exhibits no edema.   Neurological: She is alert and oriented to person, place, and time.   Skin: She is not diaphoretic.       Lab Results   Component Value Date     12/17/2019    K 4.1 12/17/2019    CL 99 12/17/2019    CO2 28.1 12/17/2019    BUN 18 12/17/2019    CREATININE 0.83 12/17/2019    GLUCOSE 88 12/17/2019    CALCIUM 9.9 12/17/2019    AST 19 12/10/2019    ALT 24 12/10/2019    ALKPHOS 52 12/10/2019    LABIL2 1.4 (L) 06/11/2015     Lab Results   Component Value Date    CKTOTAL 172 12/01/2016     Lab Results   Component Value Date    WBC 10.00 12/19/2019    HGB 12.7 12/19/2019    HCT 38.8 12/19/2019     12/19/2019     Lab Results   Component Value Date    INR <0.90 05/13/2014     Lab Results   Component Value Date    MG 2.0 12/01/2016     Lab Results   Component Value Date    TSH 3.020 12/10/2019    CHLPL 222 (H) 06/11/2015    TRIG 79 12/10/2019    HDL 53 12/10/2019     (H) 12/10/2019      Lab Results   Component Value Date    BNP 16.0 12/01/2016       During this visit the following were done:  Labs Reviewed [x]    Labs Ordered []    Radiology Reports Reviewed [x]    Radiology Ordered []    PCP Records Reviewed []    Referring Provider Records Reviewed []    ER Records Reviewed []    Hospital Records Reviewed []    History Obtained From Family []    Radiology Images Reviewed []    Other Reviewed []    Records Requested []         ECG 12 Lead  Date/Time: 8/13/2020 8:11 AM  Performed by: Zachary Little PA-C  Authorized by: Zachary Little PA-C   Comparison: compared with previous ECG   Similar to previous ECG  Rhythm: sinus rhythm  Conduction: conduction normal  ST Segments: ST segments normal  Other findings: low voltage    Clinical  impression: normal ECG            Assessment/Plan    Diagnosis Plan   1. Heart palpitations              Recommendations:  1. Overall patient appears stable from cardiac standpoint she she does still have some occasional mild palpitations however loop recorder never picked up any dysrhythmias.  She reports that these palpitations are not affecting her ADLs.  We will continue to monitor for now.        Return in about 1 year (around 8/13/2021).    As always, I appreciate very much the opportunity to participate in the cardiovascular care of your patients.      With Best Regards,    Zachary Little PA-C

## 2020-08-30 ENCOUNTER — TRANSCRIBE ORDERS (OUTPATIENT)
Dept: ADMINISTRATIVE | Facility: HOSPITAL | Age: 45
End: 2020-08-30

## 2020-08-30 ENCOUNTER — LAB (OUTPATIENT)
Dept: LAB | Facility: HOSPITAL | Age: 45
End: 2020-08-30

## 2020-08-30 DIAGNOSIS — R63.4 LOSS OF WEIGHT: ICD-10-CM

## 2020-08-30 DIAGNOSIS — Z79.899 ENCOUNTER FOR LONG-TERM (CURRENT) USE OF OTHER MEDICATIONS: ICD-10-CM

## 2020-08-30 DIAGNOSIS — E66.3 SEVERELY OVERWEIGHT: ICD-10-CM

## 2020-08-30 DIAGNOSIS — R53.83 TIREDNESS: ICD-10-CM

## 2020-08-30 DIAGNOSIS — K76.6 PORTAL HYPERTENSION (HCC): Primary | ICD-10-CM

## 2020-08-30 DIAGNOSIS — K76.6 PORTAL HYPERTENSION (HCC): ICD-10-CM

## 2020-08-30 LAB
ALBUMIN SERPL-MCNC: 4.1 G/DL (ref 3.5–5.2)
ANION GAP SERPL CALCULATED.3IONS-SCNC: 10.6 MMOL/L (ref 5–15)
BASOPHILS # BLD MANUAL: 0.05 10*3/MM3 (ref 0–0.2)
BASOPHILS NFR BLD AUTO: 1 % (ref 0–1.5)
BUN SERPL-MCNC: 9 MG/DL (ref 6–20)
BUN/CREAT SERPL: 9.2 (ref 7–25)
CALCIUM SPEC-SCNC: 9.9 MG/DL (ref 8.6–10.5)
CHLORIDE SERPL-SCNC: 101 MMOL/L (ref 98–107)
CO2 SERPL-SCNC: 24.4 MMOL/L (ref 22–29)
CREAT SERPL-MCNC: 0.98 MG/DL (ref 0.57–1)
DEPRECATED RDW RBC AUTO: 40.2 FL (ref 37–54)
EOSINOPHIL # BLD MANUAL: 0.1 10*3/MM3 (ref 0–0.4)
EOSINOPHIL NFR BLD MANUAL: 2 % (ref 0.3–6.2)
ERYTHROCYTE [DISTWIDTH] IN BLOOD BY AUTOMATED COUNT: 12.3 % (ref 12.3–15.4)
GFR SERPL CREATININE-BSD FRML MDRD: 61 ML/MIN/1.73
GLUCOSE SERPL-MCNC: 85 MG/DL (ref 65–99)
HCT VFR BLD AUTO: 42.1 % (ref 34–46.6)
HGB BLD-MCNC: 14.5 G/DL (ref 12–15.9)
LYMPHOCYTES # BLD MANUAL: 1.48 10*3/MM3 (ref 0.7–3.1)
LYMPHOCYTES NFR BLD MANUAL: 10.1 % (ref 5–12)
LYMPHOCYTES NFR BLD MANUAL: 30.3 % (ref 19.6–45.3)
MCH RBC QN AUTO: 30.9 PG (ref 26.6–33)
MCHC RBC AUTO-ENTMCNC: 34.4 G/DL (ref 31.5–35.7)
MCV RBC AUTO: 89.8 FL (ref 79–97)
MONOCYTES # BLD AUTO: 0.49 10*3/MM3 (ref 0.1–0.9)
NEUTROPHILS # BLD AUTO: 2.76 10*3/MM3 (ref 1.7–7)
NEUTROPHILS NFR BLD MANUAL: 56.6 % (ref 42.7–76)
PHOSPHATE SERPL-MCNC: 3.8 MG/DL (ref 2.5–4.5)
PLAT MORPH BLD: NORMAL
PLATELET # BLD AUTO: 240 10*3/MM3 (ref 140–450)
PMV BLD AUTO: 10.4 FL (ref 6–12)
POTASSIUM SERPL-SCNC: 3.8 MMOL/L (ref 3.5–5.2)
RBC # BLD AUTO: 4.69 10*6/MM3 (ref 3.77–5.28)
RBC MORPH BLD: NORMAL
SODIUM SERPL-SCNC: 136 MMOL/L (ref 136–145)
WBC # BLD AUTO: 4.87 10*3/MM3 (ref 3.4–10.8)
WBC MORPH BLD: NORMAL

## 2020-08-30 PROCEDURE — 86235 NUCLEAR ANTIGEN ANTIBODY: CPT

## 2020-08-30 PROCEDURE — 86008 ALLG SPEC IGE RECOMB EA: CPT

## 2020-08-30 PROCEDURE — 80069 RENAL FUNCTION PANEL: CPT

## 2020-08-30 PROCEDURE — 85027 COMPLETE CBC AUTOMATED: CPT

## 2020-08-30 PROCEDURE — 36415 COLL VENOUS BLD VENIPUNCTURE: CPT

## 2020-08-30 PROCEDURE — 85007 BL SMEAR W/DIFF WBC COUNT: CPT

## 2020-09-01 LAB
ENA SS-A AB SER-ACNC: >8 AI (ref 0–0.9)
ENA SS-B AB SER-ACNC: 0.4 AI (ref 0–0.9)

## 2020-09-02 LAB
A-LACTALB IGE QN: <0.1 KU/L
CASEIN IGE QN: <0.1 KU/L
CONV CLASS DESCRIPTION: NORMAL
LACTOGLOB IGE QN: <0.1 KU/L

## 2020-12-08 ENCOUNTER — TRANSCRIBE ORDERS (OUTPATIENT)
Dept: OTHER | Facility: OTHER | Age: 45
End: 2020-12-08

## 2020-12-08 ENCOUNTER — HOSPITAL ENCOUNTER (OUTPATIENT)
Dept: GENERAL RADIOLOGY | Facility: HOSPITAL | Age: 45
Discharge: HOME | End: 2020-12-08
Admitting: INTERNAL MEDICINE

## 2020-12-08 DIAGNOSIS — M25.572 ACUTE LEFT ANKLE PAIN: ICD-10-CM

## 2020-12-08 DIAGNOSIS — M79.672 LEFT FOOT PAIN: Primary | ICD-10-CM

## 2020-12-08 DIAGNOSIS — M79.672 LEFT FOOT PAIN: ICD-10-CM

## 2020-12-08 PROCEDURE — 73630 X-RAY EXAM OF FOOT: CPT

## 2020-12-08 PROCEDURE — 73610 X-RAY EXAM OF ANKLE: CPT

## 2020-12-08 PROCEDURE — 73630 X-RAY EXAM OF FOOT: CPT | Performed by: RADIOLOGY

## 2020-12-08 PROCEDURE — 73610 X-RAY EXAM OF ANKLE: CPT | Performed by: RADIOLOGY

## 2021-03-18 ENCOUNTER — BULK ORDERING (OUTPATIENT)
Dept: CASE MANAGEMENT | Facility: OTHER | Age: 46
End: 2021-03-18

## 2021-03-18 DIAGNOSIS — Z23 IMMUNIZATION DUE: ICD-10-CM

## 2021-08-16 ENCOUNTER — HOSPITAL ENCOUNTER (OUTPATIENT)
Dept: GENERAL RADIOLOGY | Facility: HOSPITAL | Age: 46
Discharge: HOME | End: 2021-08-16
Admitting: PHYSICIAN ASSISTANT

## 2021-08-16 ENCOUNTER — TRANSCRIBE ORDERS (OUTPATIENT)
Dept: ADMINISTRATIVE | Facility: HOSPITAL | Age: 46
End: 2021-08-16

## 2021-08-16 DIAGNOSIS — M54.50 LOW BACK PAIN, UNSPECIFIED BACK PAIN LATERALITY, UNSPECIFIED CHRONICITY, UNSPECIFIED WHETHER SCIATICA PRESENT: ICD-10-CM

## 2021-08-16 DIAGNOSIS — M54.2 CERVICALGIA: ICD-10-CM

## 2021-08-16 DIAGNOSIS — M54.2 CERVICALGIA: Primary | ICD-10-CM

## 2021-08-16 PROCEDURE — 72040 X-RAY EXAM NECK SPINE 2-3 VW: CPT | Performed by: RADIOLOGY

## 2021-08-16 PROCEDURE — 72100 X-RAY EXAM L-S SPINE 2/3 VWS: CPT

## 2021-08-16 PROCEDURE — 72100 X-RAY EXAM L-S SPINE 2/3 VWS: CPT | Performed by: RADIOLOGY

## 2021-08-16 PROCEDURE — 72040 X-RAY EXAM NECK SPINE 2-3 VW: CPT

## 2021-09-28 ENCOUNTER — HOSPITAL ENCOUNTER (EMERGENCY)
Facility: HOSPITAL | Age: 46
Discharge: HOME | End: 2021-09-28
Attending: FAMILY MEDICINE | Admitting: FAMILY MEDICINE

## 2021-09-28 ENCOUNTER — APPOINTMENT (OUTPATIENT)
Dept: CT IMAGING | Facility: HOSPITAL | Age: 46
End: 2021-09-28

## 2021-09-28 VITALS
SYSTOLIC BLOOD PRESSURE: 132 MMHG | HEART RATE: 102 BPM | BODY MASS INDEX: 45.75 KG/M2 | TEMPERATURE: 98.5 F | DIASTOLIC BLOOD PRESSURE: 71 MMHG | RESPIRATION RATE: 19 BRPM | HEIGHT: 64 IN | OXYGEN SATURATION: 96 % | WEIGHT: 268 LBS

## 2021-09-28 DIAGNOSIS — R10.9 ABDOMINAL PAIN, UNSPECIFIED ABDOMINAL LOCATION: ICD-10-CM

## 2021-09-28 DIAGNOSIS — N30.01 ACUTE CYSTITIS WITH HEMATURIA: Primary | ICD-10-CM

## 2021-09-28 DIAGNOSIS — J02.9 SORE THROAT: ICD-10-CM

## 2021-09-28 LAB
ALBUMIN SERPL-MCNC: 4.47 G/DL (ref 3.5–5.2)
ALBUMIN/GLOB SERPL: 1.6 G/DL
ALP SERPL-CCNC: 73 U/L (ref 39–117)
ALT SERPL W P-5'-P-CCNC: 24 U/L (ref 1–33)
ANION GAP SERPL CALCULATED.3IONS-SCNC: 12.2 MMOL/L (ref 5–15)
AST SERPL-CCNC: 19 U/L (ref 1–32)
BACTERIA UR QL AUTO: ABNORMAL /HPF
BASOPHILS # BLD AUTO: 0.06 10*3/MM3 (ref 0–0.2)
BASOPHILS NFR BLD AUTO: 0.5 % (ref 0–1.5)
BILIRUB SERPL-MCNC: 0.5 MG/DL (ref 0–1.2)
BILIRUB UR QL STRIP: ABNORMAL
BUN SERPL-MCNC: 16 MG/DL (ref 6–20)
BUN/CREAT SERPL: 14.2 (ref 7–25)
CALCIUM SPEC-SCNC: 9.7 MG/DL (ref 8.6–10.5)
CHLORIDE SERPL-SCNC: 102 MMOL/L (ref 98–107)
CLARITY UR: ABNORMAL
CO2 SERPL-SCNC: 23.8 MMOL/L (ref 22–29)
COLOR UR: ABNORMAL
CREAT SERPL-MCNC: 1.13 MG/DL (ref 0.57–1)
CRP SERPL-MCNC: <0.3 MG/DL (ref 0–0.5)
D-LACTATE SERPL-SCNC: 1.2 MMOL/L (ref 0.5–2)
DEPRECATED RDW RBC AUTO: 40.2 FL (ref 37–54)
EOSINOPHIL # BLD AUTO: 0.2 10*3/MM3 (ref 0–0.4)
EOSINOPHIL NFR BLD AUTO: 1.8 % (ref 0.3–6.2)
ERYTHROCYTE [DISTWIDTH] IN BLOOD BY AUTOMATED COUNT: 12.3 % (ref 12.3–15.4)
FLUAV SUBTYP SPEC NAA+PROBE: NOT DETECTED
FLUBV RNA ISLT QL NAA+PROBE: NOT DETECTED
GFR SERPL CREATININE-BSD FRML MDRD: 52 ML/MIN/1.73
GLOBULIN UR ELPH-MCNC: 2.8 GM/DL
GLUCOSE SERPL-MCNC: 128 MG/DL (ref 65–99)
GLUCOSE UR STRIP-MCNC: NEGATIVE MG/DL
HCT VFR BLD AUTO: 42.3 % (ref 34–46.6)
HGB BLD-MCNC: 14.2 G/DL (ref 12–15.9)
HGB UR QL STRIP.AUTO: ABNORMAL
HOLD SPECIMEN: NORMAL
HOLD SPECIMEN: NORMAL
HYALINE CASTS UR QL AUTO: ABNORMAL /LPF
IMM GRANULOCYTES # BLD AUTO: 0.06 10*3/MM3 (ref 0–0.05)
IMM GRANULOCYTES NFR BLD AUTO: 0.5 % (ref 0–0.5)
KETONES UR QL STRIP: ABNORMAL
LEUKOCYTE ESTERASE UR QL STRIP.AUTO: ABNORMAL
LIPASE SERPL-CCNC: 65 U/L (ref 13–60)
LYMPHOCYTES # BLD AUTO: 2.09 10*3/MM3 (ref 0.7–3.1)
LYMPHOCYTES NFR BLD AUTO: 18.6 % (ref 19.6–45.3)
MCH RBC QN AUTO: 30.3 PG (ref 26.6–33)
MCHC RBC AUTO-ENTMCNC: 33.6 G/DL (ref 31.5–35.7)
MCV RBC AUTO: 90.4 FL (ref 79–97)
MONOCYTES # BLD AUTO: 0.64 10*3/MM3 (ref 0.1–0.9)
MONOCYTES NFR BLD AUTO: 5.7 % (ref 5–12)
NEUTROPHILS NFR BLD AUTO: 72.9 % (ref 42.7–76)
NEUTROPHILS NFR BLD AUTO: 8.16 10*3/MM3 (ref 1.7–7)
NITRITE UR QL STRIP: NEGATIVE
NRBC BLD AUTO-RTO: 0 /100 WBC (ref 0–0.2)
PH UR STRIP.AUTO: 5.5 [PH] (ref 5–8)
PLATELET # BLD AUTO: 250 10*3/MM3 (ref 140–450)
PMV BLD AUTO: 8.9 FL (ref 6–12)
POTASSIUM SERPL-SCNC: 4 MMOL/L (ref 3.5–5.2)
PROT SERPL-MCNC: 7.3 G/DL (ref 6–8.5)
PROT UR QL STRIP: ABNORMAL
RBC # BLD AUTO: 4.68 10*6/MM3 (ref 3.77–5.28)
RBC # UR: ABNORMAL /HPF
REF LAB TEST METHOD: ABNORMAL
S PYO AG THROAT QL: NEGATIVE
SARS-COV-2 RNA PNL SPEC NAA+PROBE: NOT DETECTED
SODIUM SERPL-SCNC: 138 MMOL/L (ref 136–145)
SP GR UR STRIP: >1.03 (ref 1–1.03)
SQUAMOUS #/AREA URNS HPF: ABNORMAL /HPF
UROBILINOGEN UR QL STRIP: ABNORMAL
WBC # BLD AUTO: 11.21 10*3/MM3 (ref 3.4–10.8)
WBC UR QL AUTO: ABNORMAL /HPF
WHOLE BLOOD HOLD SPECIMEN: NORMAL
WHOLE BLOOD HOLD SPECIMEN: NORMAL

## 2021-09-28 PROCEDURE — 81001 URINALYSIS AUTO W/SCOPE: CPT | Performed by: FAMILY MEDICINE

## 2021-09-28 PROCEDURE — 74176 CT ABD & PELVIS W/O CONTRAST: CPT

## 2021-09-28 PROCEDURE — 25010000002 CEFTRIAXONE PER 250 MG: Performed by: FAMILY MEDICINE

## 2021-09-28 PROCEDURE — 80053 COMPREHEN METABOLIC PANEL: CPT | Performed by: PHYSICIAN ASSISTANT

## 2021-09-28 PROCEDURE — 99283 EMERGENCY DEPT VISIT LOW MDM: CPT

## 2021-09-28 PROCEDURE — 87636 SARSCOV2 & INF A&B AMP PRB: CPT | Performed by: PHYSICIAN ASSISTANT

## 2021-09-28 PROCEDURE — 83690 ASSAY OF LIPASE: CPT | Performed by: PHYSICIAN ASSISTANT

## 2021-09-28 PROCEDURE — 83605 ASSAY OF LACTIC ACID: CPT | Performed by: PHYSICIAN ASSISTANT

## 2021-09-28 PROCEDURE — 87040 BLOOD CULTURE FOR BACTERIA: CPT | Performed by: PHYSICIAN ASSISTANT

## 2021-09-28 PROCEDURE — 87081 CULTURE SCREEN ONLY: CPT | Performed by: FAMILY MEDICINE

## 2021-09-28 PROCEDURE — 85025 COMPLETE CBC W/AUTO DIFF WBC: CPT | Performed by: PHYSICIAN ASSISTANT

## 2021-09-28 PROCEDURE — 96365 THER/PROPH/DIAG IV INF INIT: CPT

## 2021-09-28 PROCEDURE — 86140 C-REACTIVE PROTEIN: CPT | Performed by: PHYSICIAN ASSISTANT

## 2021-09-28 PROCEDURE — 87880 STREP A ASSAY W/OPTIC: CPT | Performed by: FAMILY MEDICINE

## 2021-09-28 RX ORDER — CEFDINIR 300 MG/1
300 CAPSULE ORAL 2 TIMES DAILY
Qty: 14 CAPSULE | Refills: 0 | Status: SHIPPED | OUTPATIENT
Start: 2021-09-28 | End: 2023-02-20

## 2021-09-28 RX ADMIN — CEFTRIAXONE SODIUM 1 G: 1 INJECTION, POWDER, FOR SOLUTION INTRAMUSCULAR; INTRAVENOUS at 01:20

## 2021-09-30 LAB — BACTERIA SPEC AEROBE CULT: NORMAL

## 2021-10-03 LAB
BACTERIA SPEC AEROBE CULT: NORMAL
BACTERIA SPEC AEROBE CULT: NORMAL

## 2021-10-04 ENCOUNTER — OFFICE VISIT (OUTPATIENT)
Dept: UROLOGY | Facility: CLINIC | Age: 46
End: 2021-10-04

## 2021-10-04 VITALS
SYSTOLIC BLOOD PRESSURE: 134 MMHG | WEIGHT: 276 LBS | BODY MASS INDEX: 47.12 KG/M2 | HEIGHT: 64 IN | HEART RATE: 98 BPM | DIASTOLIC BLOOD PRESSURE: 70 MMHG

## 2021-10-04 DIAGNOSIS — R35.0 FREQUENCY OF URINATION: Primary | ICD-10-CM

## 2021-10-04 LAB
BILIRUB BLD-MCNC: NEGATIVE MG/DL
CLARITY, POC: CLEAR
COLOR UR: YELLOW
GLUCOSE UR STRIP-MCNC: NEGATIVE MG/DL
KETONES UR QL: NEGATIVE
LEUKOCYTE EST, POC: NEGATIVE
NITRITE UR-MCNC: NEGATIVE MG/ML
PH UR: 6 [PH] (ref 5–8)
PROT UR STRIP-MCNC: NEGATIVE MG/DL
RBC # UR STRIP: NEGATIVE /UL
SP GR UR: 1.02 (ref 1–1.03)
UROBILINOGEN UR QL: NORMAL

## 2021-10-04 PROCEDURE — 99203 OFFICE O/P NEW LOW 30 MIN: CPT | Performed by: UROLOGY

## 2021-10-04 PROCEDURE — 81003 URINALYSIS AUTO W/O SCOPE: CPT | Performed by: UROLOGY

## 2021-10-04 PROCEDURE — 87086 URINE CULTURE/COLONY COUNT: CPT | Performed by: UROLOGY

## 2021-10-04 NOTE — PROGRESS NOTES
Gross Hematuria Female Office Visit      Patient Name: Anna Andres  : 1975   MRN: [id number]     Chief Complaint:  Gross Hematuria.   Chief Complaint   Patient presents with   • gross hematuria     New patient    • Urinary Tract Infection       Referring Provider: Rajesh Palmer MD    History of Present Illness: Ms. Andres is a 46 y.o. female with history of gross hematuria. Reports 1 weeks ago she had gross hematuria with clots.  This lasted for one day.  She went to the ER and was given IV antibiotics and then discharged following day with PO antibiotics.  No urine culture was sent.    She reported severe burning and pain with her urination.  She does not think she passed a kidney stone.  She has never had a kidney stone.    She has had gross hematuria 2-3 years ago and was also treated with a UTI.        I reviewed her CT scan from the emergency room which did not show any concerning urologic findings.  There were no stones or hydronephrosis.    She also reports ALMITA with coughing and sneezing.  Wears pads during allergy season.  Has 2 children but both adopted.  She has undergone hysterectomy.      Prior occupational exposures include: None    Smoking history: 1/2 ppd x 10 years.  Quit in     Family history of  malignancy:     Family history of colon, breast, ovarian malignancy: None    She has a history of lymphocytic dermatitis and takes Xolair.  She also has a history of Sjögren.         Subjective      Review of System: Review of Systems   Constitutional: Positive for fatigue.   HENT: Negative for sinus pressure.    Eyes: Negative for pain and redness.   Respiratory: Positive for chest tightness and shortness of breath.    Cardiovascular: Negative for chest pain.   Gastrointestinal: Positive for abdominal pain. Negative for constipation and diarrhea.   Endocrine: Negative for heat intolerance.   Genitourinary: Positive for flank pain, frequency and hematuria. Negative for  dysuria, pelvic pain and urgency.   Musculoskeletal: Positive for back pain.   Skin: Negative for rash.   Allergic/Immunologic: Negative for food allergies.   Neurological: Positive for headaches.   Hematological: Bruises/bleeds easily.   Psychiatric/Behavioral: The patient is not nervous/anxious.       I have reviewed the ROS documented by my clinical staff,  I have updated appropriately and I agree. Mattie Fernandez MD    Past Medical History:  Past Medical History:   Diagnosis Date   • Abnormal uterine bleeding (AUB)    • Arthritis    • Asthma    • Fibroids     UTERINE   • Fibromyalgia    • GERD (gastroesophageal reflux disease)    • Heart palpitations    • Heartburn    • History of chest pain    • History of loop recorder     currently has   • Hypertension    • Migraine    • Mitral valve prolapse    • Palpitations    • PONV (postoperative nausea and vomiting)    • Psoriasis     UNDER NAIL BEDS  ON FEET   • PTTD (posterior tibial tendon dysfunction)    • Raynaud disease    • Sjogren's syndrome (HCC)    • Sleep apnea    • Sleep apnea with use of continuous positive airway pressure (CPAP)        Past Surgical History:  Past Surgical History:   Procedure Laterality Date   • ABDOMINAL SURGERY     • BREAST LUMPECTOMY Left     lumpectomy   • CARDIAC CATHETERIZATION     • CARDIAC ELECTROPHYSIOLOGY PROCEDURE N/A 1/14/2020    Procedure: Loop recorder removal;  Surgeon: Milton Trotter MD;  Location: [facility] INVASIVE LOCATION;  Service: Cardiovascular   • CHOLECYSTECTOMY     • COLONOSCOPY     • D & C AND LAPAROSCOPY     • DIAGNOSTIC LAPAROSCOPY     • LAPAROSCOPIC TUBAL LIGATION     • LASER ABLATION     • SKIN BIOPSY     • TOTAL LAPAROSCOPIC HYSTERECTOMY N/A 12/18/2019    Procedure: TOTAL LAPAROSCOPIC HYSTERECTOMY BILATERAL SALPIGECTOMY WITH DAVINCI SI ROBOT;  Surgeon: Alo Duenas DO;  Location: [facility] OR;  Service: DaVinci   • WISDOM TOOTH EXTRACTION         Medications:    Current Outpatient Medications:    •  albuterol (PROVENTIL HFA;VENTOLIN HFA) 108 (90 BASE) MCG/ACT inhaler, Inhale 2 puffs every 4 (four) hours as needed for wheezing., Disp: , Rfl:   •  azelastine (ASTELIN) 0.1 % nasal spray, 2 sprays into the nostril(s) as directed by provider 2 (Two) Times a Day As Needed for Rhinitis or Allergies., Disp: , Rfl:   •  Betamethasone Dipropionate (SERNIVO) 0.05 % emulsion, Apply 1 application topically Daily As Needed., Disp: , Rfl:   •  cefdinir (OMNICEF) 300 MG capsule, Take 1 capsule by mouth 2 (Two) Times a Day., Disp: 14 capsule, Rfl: 0  •  clobetasol (TEMOVATE) 0.05 % external solution, Apply 1 application topically to the appropriate area as directed 2 (Two) Times a Day., Disp: , Rfl:   •  Clocortolone Pivalate 0.1 % cream, Apply 1 application topically to the appropriate area as directed 2 (Two) Times a Day As Needed., Disp: , Rfl:   •  coenzyme Q10 100 MG capsule, Take 100 mg by mouth Daily., Disp: , Rfl:   •  cyanocobalamin 1000 MCG/ML injection, Inject 1,000 mcg under the skin into the appropriate area as directed Every 30 (Thirty) Days., Disp: , Rfl: 3  •  DULoxetine (CYMBALTA) 60 MG capsule, Take 60 mg by mouth Daily., Disp: , Rfl: 1  •  fluticasone (FLONASE) 50 MCG/ACT nasal spray, 2 sprays into the nostril(s) as directed by provider Daily As Needed for Rhinitis or Allergies., Disp: , Rfl:   •  hydroxychloroquine (PLAQUENIL) 200 MG tablet, Take 200 mg by mouth 2 (Two) Times a Day., Disp: , Rfl: 1  •  Ivermectin (SOOLANTRA) 1 % cream, Apply 1 application topically Every Night., Disp: , Rfl:   •  montelukast (SINGULAIR) 10 MG tablet, Take 10 mg by mouth Daily., Disp: , Rfl: 3  •  multivitamin (DAILY RNO) tablet tablet, Take 1 tablet by mouth Daily., Disp: , Rfl:   •  naratriptan (AMERGE) 2.5 MG tablet, Take 2.5 mg by mouth Daily As Needed for Headache or Migraine., Disp: , Rfl: 5  •  Omega-3 Fatty Acids (FISH OIL) 1000 MG capsule capsule, Take 1,000 mg by mouth Daily With Breakfast., Disp: , Rfl:   •   "Oxymetazoline HCl (RHOFADE) 1 % cream, Apply 1 application topically Daily As Needed., Disp: , Rfl:   •  pantoprazole (PROTONIX) 40 MG EC tablet, Take 40 mg by mouth Daily As Needed., Disp: , Rfl: 3  •  PULMICORT FLEXHALER 180 MCG/ACT inhaler, Inhale 1-2 puffs Daily., Disp: , Rfl: 3  •  triamcinolone (KENALOG) 0.1 % cream, Apply 1 application topically to the appropriate area as directed 2 (Two) Times a Day As Needed for Irritation or Rash., Disp: , Rfl:   •  vitamin D (ERGOCALCIFEROL) 10550 UNITS capsule capsule, Take 50,000 Units by mouth 1 (One) Time Per Week., Disp: , Rfl: 4    Allergies:  Allergies   Allergen Reactions   • Avelox [Moxifloxacin] Other (See Comments)     Severe leg pains   • Levaquin [Levofloxacin] Other (See Comments)     Severe leg pains   • Meloxicam Palpitations   • Quinolones Other (See Comments)     Severe leg pains   • Metaxalone Other (See Comments)     \"skelaxin\"  Flu like symptoms   • Bupropion Hcl Rash     \"zyban\"   • Celecoxib Rash       Social History:  Social History     Socioeconomic History   • Marital status:      Spouse name: Not on file   • Number of children: Not on file   • Years of education: Not on file   • Highest education level: Not on file   Tobacco Use   • Smoking status: Former Smoker     Packs/day: 0.50     Years: 10.00     Pack years: 5.00     Types: Cigarettes     Quit date: 2011     Years since quitting: 10.7   • Smokeless tobacco: Never Used   Substance and Sexual Activity   • Alcohol use: No   • Drug use: No   • Sexual activity: Defer     Partners: Male     Birth control/protection: Surgical       Family History:  Family History   Problem Relation Age of Onset   • Heart disease Father    • Heart attack Father    • Heart disease Paternal Grandmother    • Heart attack Paternal Grandmother        Objective     Physical Exam:   Vital Signs:   Vitals:    10/04/21 1530   BP: 134/70   Pulse: 98   Weight: 125 kg (276 lb)   Height: 162.6 cm (64\")     Body mass " index is 47.38 kg/m².     Physical Exam  Vitals and nursing note reviewed.   Constitutional:       General: She is awake.      Appearance: Normal appearance. She is well-developed. She is obese.   HENT:      Head: Normocephalic and atraumatic.      Right Ear: External ear normal.      Left Ear: External ear normal.   Eyes:      Conjunctiva/sclera: Conjunctivae normal.   Pulmonary:      Effort: Pulmonary effort is normal.   Abdominal:      Palpations: Abdomen is soft.   Musculoskeletal:      Cervical back: Normal range of motion.   Skin:     General: Skin is warm.   Neurological:      General: No focal deficit present.      Mental Status: She is alert and oriented to person, place, and time.      Gait: Gait normal.   Psychiatric:         Behavior: Behavior normal. Behavior is cooperative.         Labs:   Brief Urine Lab Results  (Last result in the past 365 days)      Color   Clarity   Blood   Leuk Est   Nitrite   Protein   CREAT   Urine HCG        10/04/21 1536 Yellow Clear Negative Negative Negative Negative                    Lab Results   Component Value Date    GLUCOSE 128 (H) 09/28/2021    CALCIUM 9.7 09/28/2021     09/28/2021    K 4.0 09/28/2021    CO2 23.8 09/28/2021     09/28/2021    BUN 16 09/28/2021    CREATININE 1.13 (H) 09/28/2021    EGFRIFNONA 52 (L) 09/28/2021    BCR 14.2 09/28/2021    ANIONGAP 12.2 09/28/2021       Lab Results   Component Value Date    WBC 11.21 (H) 09/28/2021    HGB 14.2 09/28/2021    HCT 42.3 09/28/2021    MCV 90.4 09/28/2021     09/28/2021       Images:   CT Abdomen Pelvis Without Contrast    Result Date: 9/28/2021  No urinary stones are identified Prior hysterectomy Otherwise normal Signer Name: Munir Escobar MD  Signed: 9/28/2021 3:03 AM  Workstation Name: [facility]  [facility]    XR Spine Cervical 2 or 3 View    Result Date: 8/17/2021    No acute findings in the cervical spine.  This report was finalized on 8/17/2021 9:06 AM by   "Hilario Sifuentes MD.      XR Spine Lumbar AP & Lateral    Result Date: 8/17/2021    No acute findings in the lumbar spine.  This report was finalized on 8/17/2021 9:07 AM by Dr. Hilario Sifuentes MD.        Measures:   Tobacco:   Anna Andres  reports that she quit smoking about 10 years ago. Her smoking use included cigarettes. She has a 5.00 pack-year smoking history. She has never used smokeless tobacco.. I have educated her on the risk of diseases from using tobacco products such as cancer, COPD and heart disease.     Urine Incontinence: ( NOUI)  Occasionally leaks urine with coughing or sneezing.     Assessment / Plan      Assessment/Plan:   Ms. Andres is a 46 y.o. female who presented today for evaluation of gross hematuria.        The patient was counseled regarding the possible etiologies, relevant work-up and diagnostic approach for gross hematuria, as well as the relevant risk categories as assigned by the American Urological Association guidelines.  I discussed that the definition of microscopic hematuria includes a microscopic urinalysis (not dipstick UA) positive for greater than 3 RBCs per high-powered field under microscopy.  We also discussed that any history of gross hematuria (or visible hematuria) places a patient at higher risk for occult urologic malignancies and necessitates prompt workup.  We discussed the aforementioned risk categories as assigned by the AUA, which are depicted below.  Ultimately, work-up is mandatory for gross or visible hematuria, as indicated by the \"HIGH RISK\" category and recommendations as depicted by the AUA Guidelines.  Given the patient's age, no smoking history, however, gross hematuria; the patient is deemed HIGH risk, and for these reasons I recommend proceeding with a flexible diagnostic cystoscopy and CT urogram to assess the collecting system of the kidney and bladder.                Diagnoses and all orders for this visit:    1. Frequency of urination " (Primary)  -     POC Urinalysis Dipstick, Automated  -     Urine Culture - Urine, Urine, Clean Catch           Follow Up:   Return in about 2 weeks (around 10/18/2021) for Follow up with images, Procedure next visit.    I spent approximately 40 minutes providing clinical care for this patient; including review of patient's chart and provider documentation, face to face time spent with patient in examination room (obtaining history, performing physical exam, discussing diagnosis and management options), placing orders, and completing patient documentation.     Mattie Fernandez MD  [facility]

## 2021-10-05 LAB — BACTERIA SPEC AEROBE CULT: NORMAL

## 2021-10-20 ENCOUNTER — OFFICE VISIT (OUTPATIENT)
Dept: CARDIOLOGY | Facility: CLINIC | Age: 46
End: 2021-10-20

## 2021-10-20 VITALS
SYSTOLIC BLOOD PRESSURE: 128 MMHG | BODY MASS INDEX: 47.12 KG/M2 | DIASTOLIC BLOOD PRESSURE: 88 MMHG | WEIGHT: 276 LBS | TEMPERATURE: 97.3 F | HEART RATE: 90 BPM | RESPIRATION RATE: 16 BRPM | HEIGHT: 64 IN

## 2021-10-20 DIAGNOSIS — R00.2 PALPITATIONS: ICD-10-CM

## 2021-10-20 DIAGNOSIS — R00.2 HEART PALPITATIONS: Primary | ICD-10-CM

## 2021-10-20 PROCEDURE — 99213 OFFICE O/P EST LOW 20 MIN: CPT | Performed by: PHYSICIAN ASSISTANT

## 2021-10-20 PROCEDURE — 93000 ELECTROCARDIOGRAM COMPLETE: CPT | Performed by: PHYSICIAN ASSISTANT

## 2021-10-20 RX ORDER — CYCLOBENZAPRINE HCL 5 MG
5 TABLET ORAL 3 TIMES DAILY PRN
COMMUNITY
End: 2023-02-20

## 2021-10-20 RX ORDER — LEVOCETIRIZINE DIHYDROCHLORIDE 5 MG/1
5 TABLET, FILM COATED ORAL NIGHTLY
COMMUNITY

## 2021-10-20 RX ORDER — CETIRIZINE HYDROCHLORIDE 5 MG/1
5 TABLET ORAL EVERY MORNING
COMMUNITY
End: 2023-02-20

## 2021-10-20 NOTE — PROGRESS NOTES
"Rajesh Palmer MD  Anna Urbanen  1975  10/20/2021    Patient Active Problem List   Diagnosis   • Gougerout-Sjoegren syndrome   • Breath shortness   • Acute bronchitis   • Heart palpitations   • Dizziness   • Near syncope   • Abnormal uterine bleeding (AUB)   • Encounter for loop recorder at end of battery life       Dear Rajesh Palmer MD:    Subjective     History of Present Illness:    Chief Complaint   Patient presents with   • Palpitations     1 year follow   • Chest Pain     occas   • Shortness of Breath     mild exertion   • Edema     LE   • Med Management     list from phone       Anna Andres is a pleasant 46 y.o. female with a past medical history significant for no known coronary artery disease or CVA, no history of tobacco abuse and nondiabetic.  She does have history of palpitations with subsequent loop recorder implantation that was recently removed.  She does have family history of premature coronary artery disease with her father having an MI at 39 years of age. She comes in for routine cardiology follow-up.    Porsche reports overall she has been doing well since she was last seen.  She does report that she was having a few episodes of chest pains roughly 1 month ago when she was congested from allergies.  Since the congestion resolved she has had no recurrence of the chest pains.  In regards to her palpitations he reports these been very well controlled as well reporting only 1 instance since she was last seen 1 year ago.  She denies any dizziness or syncope.    Allergies   Allergen Reactions   • Avelox [Moxifloxacin] Other (See Comments)     Severe leg pains   • Levaquin [Levofloxacin] Other (See Comments)     Severe leg pains   • Meloxicam Palpitations   • Quinolones Other (See Comments)     Severe leg pains   • Metaxalone Other (See Comments)     \"skelaxin\"  Flu like symptoms   • Bupropion Hcl Rash     \"zyban\"   • Celecoxib Rash   :      Current Outpatient Medications:   •  " albuterol (PROVENTIL HFA;VENTOLIN HFA) 108 (90 BASE) MCG/ACT inhaler, Inhale 2 puffs every 4 (four) hours as needed for wheezing., Disp: , Rfl:   •  azelastine (ASTELIN) 0.1 % nasal spray, 2 sprays into the nostril(s) as directed by provider 2 (Two) Times a Day As Needed for Rhinitis or Allergies., Disp: , Rfl:   •  cetirizine (zyrTEC) 5 MG tablet, Take 5 mg by mouth Every Morning., Disp: , Rfl:   •  clobetasol (TEMOVATE) 0.05 % external solution, Apply 1 application topically to the appropriate area as directed 2 (Two) Times a Day., Disp: , Rfl:   •  Clocortolone Pivalate 0.1 % cream, Apply 1 application topically to the appropriate area as directed 2 (Two) Times a Day As Needed., Disp: , Rfl:   •  coenzyme Q10 100 MG capsule, Take 200 mg by mouth Daily., Disp: , Rfl:   •  cyanocobalamin 1000 MCG/ML injection, Inject 1,000 mcg under the skin into the appropriate area as directed Every 30 (Thirty) Days., Disp: , Rfl: 3  •  cyclobenzaprine (FLEXERIL) 5 MG tablet, Take 5 mg by mouth 3 (Three) Times a Day As Needed for Muscle Spasms., Disp: , Rfl:   •  DULoxetine (CYMBALTA) 60 MG capsule, Take 60 mg by mouth Daily., Disp: , Rfl: 1  •  fluticasone (FLONASE) 50 MCG/ACT nasal spray, 2 sprays into the nostril(s) as directed by provider Daily As Needed for Rhinitis or Allergies., Disp: , Rfl:   •  hydroxychloroquine (PLAQUENIL) 200 MG tablet, Take 200 mg by mouth 2 (Two) Times a Day., Disp: , Rfl: 1  •  Ivermectin (SOOLANTRA) 1 % cream, Apply 1 application topically Every Night., Disp: , Rfl:   •  levocetirizine (XYZAL) 5 MG tablet, Take 5 mg by mouth Every Night., Disp: , Rfl:   •  montelukast (SINGULAIR) 10 MG tablet, Take 10 mg by mouth Daily., Disp: , Rfl: 3  •  multivitamin (DAILY RON) tablet tablet, Take 1 tablet by mouth Daily., Disp: , Rfl:   •  naratriptan (AMERGE) 2.5 MG tablet, Take 2.5 mg by mouth Daily As Needed for Headache or Migraine., Disp: , Rfl: 5  •  omalizumab (XOLAIR) 150 MG injection, Inject 300 mg  "under the skin into the appropriate area as directed 1 (One) Time., Disp: , Rfl:   •  Omega-3 Fatty Acids (FISH OIL) 1000 MG capsule capsule, Take 1,000 mg by mouth Daily With Breakfast., Disp: , Rfl:   •  Oxymetazoline HCl (RHOFADE) 1 % cream, Apply 1 application topically Daily As Needed., Disp: , Rfl:   •  pantoprazole (PROTONIX) 40 MG EC tablet, Take 40 mg by mouth Daily As Needed., Disp: , Rfl: 3  •  PULMICORT FLEXHALER 180 MCG/ACT inhaler, Inhale 1-2 puffs Daily., Disp: , Rfl: 3  •  triamcinolone (KENALOG) 0.1 % cream, Apply 1 application topically to the appropriate area as directed 2 (Two) Times a Day As Needed for Irritation or Rash., Disp: , Rfl:   •  vitamin D (ERGOCALCIFEROL) 26717 UNITS capsule capsule, Take 50,000 Units by mouth 1 (One) Time Per Week., Disp: , Rfl: 4  •  Betamethasone Dipropionate (SERNIVO) 0.05 % emulsion, Apply 1 application topically Daily As Needed., Disp: , Rfl:   •  cefdinir (OMNICEF) 300 MG capsule, Take 1 capsule by mouth 2 (Two) Times a Day., Disp: 14 capsule, Rfl: 0    The following portions of the patient's history were reviewed and updated as appropriate: allergies, current medications, past family history, past medical history, past social history, past surgical history and problem list.    Social History     Tobacco Use   • Smoking status: Former Smoker     Packs/day: 0.50     Years: 10.00     Pack years: 5.00     Types: Cigarettes     Quit date: 2011     Years since quitting: 10.8   • Smokeless tobacco: Never Used   Substance Use Topics   • Alcohol use: No   • Drug use: No         Objective   Vitals:    10/20/21 1527   BP: 128/88   Pulse: 90   Resp: 16   Temp: 97.3 °F (36.3 °C)   Weight: 125 kg (276 lb)   Height: 162.6 cm (64\")     Body mass index is 47.38 kg/m².    Constitutional:       General: Not in acute distress.     Appearance: Healthy appearance. Well-developed and not in distress. Not diaphoretic.   Eyes:      Conjunctiva/sclera: Conjunctivae normal.      " Pupils: Pupils are equal, round, and reactive to light.   HENT:      Head: Normocephalic and atraumatic.   Neck:      Vascular: No carotid bruit or JVD.   Pulmonary:      Effort: Pulmonary effort is normal. No respiratory distress.      Breath sounds: Normal breath sounds.   Cardiovascular:      Normal rate. Regular rhythm.   Skin:     General: Skin is cool.   Neurological:      Mental Status: Alert, oriented to person, place, and time and oriented to person, place and time.         Lab Results   Component Value Date     09/28/2021    K 4.0 09/28/2021     09/28/2021    CO2 23.8 09/28/2021    BUN 16 09/28/2021    CREATININE 1.13 (H) 09/28/2021    GLUCOSE 128 (H) 09/28/2021    CALCIUM 9.7 09/28/2021    AST 19 09/28/2021    ALT 24 09/28/2021    ALKPHOS 73 09/28/2021    LABIL2 1.4 (L) 06/11/2015     Lab Results   Component Value Date    CKTOTAL 172 12/01/2016     Lab Results   Component Value Date    WBC 11.21 (H) 09/28/2021    HGB 14.2 09/28/2021    HCT 42.3 09/28/2021     09/28/2021     Lab Results   Component Value Date    INR <0.90 05/13/2014     Lab Results   Component Value Date    MG 2.0 12/01/2016     Lab Results   Component Value Date    TSH 3.020 12/10/2019    CHLPL 222 (H) 06/11/2015    TRIG 79 12/10/2019    HDL 53 12/10/2019     (H) 12/10/2019      Lab Results   Component Value Date    BNP 16.0 12/01/2016       During this visit the following were done:  Labs Reviewed [x]    Labs Ordered [x]    Radiology Reports Reviewed []    Radiology Ordered []    PCP Records Reviewed []    Referring Provider Records Reviewed []    ER Records Reviewed []    Hospital Records Reviewed []    History Obtained From Family []    Radiology Images Reviewed []    Other Reviewed []    Records Requested []         ECG 12 Lead    Date/Time: 10/20/2021 3:29 PM  Performed by: Zachary Little PA-C  Authorized by: Zachary Little PA-C   Comparison: compared with previous ECG   Similar to previous  ECG  Rhythm: sinus rhythm  Conduction: conduction normal  ST Segments: ST segments normal    Clinical impression: normal ECG            Assessment/Plan    Diagnosis Plan   1. Heart palpitations  Lipid Panel   2. Palpitations  Lipid Panel            Recommendations:  1. Precordial pain  1. Seem to be isolated incidents with no recurrence she had a stress test in 2019 that was unremarkable.  For now we will continue to monitor however if pains become recurrent will consider ischemic work-up.  I did explain this to the patient she expressed understanding.  2. I will order lipid panel for routine monitoring.  2. Mild MR and AR  1. Discussed with her about holding off on repeating echocardiogram due to COVID-19 pandemic and that she is completely asymptomatic she is agreeable to this so we will plan on repeating echocardiogram at next visit.      No follow-ups on file.    As always, I appreciate very much the opportunity to participate in the cardiovascular care of your patients.      With Best Regards,    Zachary Little PA-C

## 2021-10-25 ENCOUNTER — OFFICE VISIT (OUTPATIENT)
Dept: UROLOGY | Facility: CLINIC | Age: 46
End: 2021-10-25

## 2021-10-25 VITALS
BODY MASS INDEX: 48.14 KG/M2 | WEIGHT: 282 LBS | HEIGHT: 64 IN | DIASTOLIC BLOOD PRESSURE: 70 MMHG | SYSTOLIC BLOOD PRESSURE: 120 MMHG

## 2021-10-25 DIAGNOSIS — R35.0 FREQUENCY OF URINATION: Primary | ICD-10-CM

## 2021-10-25 DIAGNOSIS — R31.0 GROSS HEMATURIA: ICD-10-CM

## 2021-10-25 DIAGNOSIS — N39.0 RECURRENT UTI: ICD-10-CM

## 2021-10-25 DIAGNOSIS — Z48.816 AFTERCARE FOLLOWING SURGERY OF THE GENITOURINARY SYSTEM: ICD-10-CM

## 2021-10-25 LAB
BILIRUB BLD-MCNC: NEGATIVE MG/DL
CLARITY, POC: CLEAR
COLOR UR: YELLOW
EXPIRATION DATE: NORMAL
GLUCOSE UR STRIP-MCNC: NEGATIVE MG/DL
KETONES UR QL: NEGATIVE
LEUKOCYTE EST, POC: NEGATIVE
Lab: NORMAL
NITRITE UR-MCNC: NEGATIVE MG/ML
PH UR: 6 [PH] (ref 5–8)
PROT UR STRIP-MCNC: NEGATIVE MG/DL
RBC # UR STRIP: NEGATIVE /UL
SP GR UR: 1 (ref 1–1.03)
UROBILINOGEN UR QL: NORMAL

## 2021-10-25 PROCEDURE — 99212 OFFICE O/P EST SF 10 MIN: CPT | Performed by: UROLOGY

## 2021-10-25 PROCEDURE — 96372 THER/PROPH/DIAG INJ SC/IM: CPT | Performed by: UROLOGY

## 2021-10-25 PROCEDURE — 87086 URINE CULTURE/COLONY COUNT: CPT | Performed by: UROLOGY

## 2021-10-25 PROCEDURE — 81003 URINALYSIS AUTO W/O SCOPE: CPT | Performed by: UROLOGY

## 2021-10-25 RX ORDER — GENTAMICIN SULFATE 40 MG/ML
80 INJECTION, SOLUTION INTRAMUSCULAR; INTRAVENOUS ONCE
Status: COMPLETED | OUTPATIENT
Start: 2021-10-25 | End: 2021-10-25

## 2021-10-25 RX ADMIN — GENTAMICIN SULFATE 80 MG: 40 INJECTION, SOLUTION INTRAMUSCULAR; INTRAVENOUS at 11:17

## 2021-10-25 NOTE — PROGRESS NOTES
Gross Hematuria Female Office Visit      Patient Name: Anna Andres  : 1975   MRN: [id number]     Chief Complaint:  Gross Hematuria.   Chief Complaint   Patient presents with   • Blood in Urine     Cysto       Referring Provider: No ref. provider found    History of Present Illness: Ms. Andres is a 46 y.o. female with history of gross hematuria. She is here today for cystoscopy.    Preprocedure diagnosis  Gross hematuria    Postprocedure diagnosis  Same    Procedure  Flexible Cystourethroscopy    Attending surgeon  Mattie Fernandez MD    Anesthesia  2% lidocaine jelly intraurethrally    Complications  None    Indications  46 y.o. female undergoing a flexible cystoscopy for the above mentioned indications.      Informed consent was obtained prior to the procedure start.       Findings  Cystoscopy revealed normal bladder mucosa with NO tumors, masses, stones or trabeculations noted.      Procedure  The patient was placed in supine position and prepped and draped in sterile fashion with lidocaine jelly instilled 5 minutes pre-procedure start.  A brief timeout including available nursing staff and awake patient was performed.  The 16 Fr digital flexible cystoscope was lubricated and gently placed into the urethral meatus. The proximal and distal portions of the urethra appeared well vascularized and normal in appearance. The bladder neck was visualized and appeared well vascularized without mucosal lesions or abnormal appearance. The bladder was then entered and  completely visualized including the trigone. There were bilateral orthotopic ureteral orifices which appeared patent and effluxed clear yellow urine. The posterior wall, lateral walls, anterior wall, and dome were visualized. The cystoscope was then retroflexed and the bladder neck was further visualized and appeared normal.  The scope was gently withdrawn and the procedure terminated.  The patient tolerated the procedure well.                 Subjective      Review of System: Review of Systems   I have reviewed the ROS documented by my clinical staff,  I have updated appropriately and I agree. Mattie Fernandez MD    Past Medical History:  Past Medical History:   Diagnosis Date   • Abnormal uterine bleeding (AUB)    • Arthritis    • Asthma    • Fibroid 2008    Breast & uterus   • Fibroids     UTERINE   • Fibromyalgia    • GERD (gastroesophageal reflux disease)    • Heart palpitations    • Heartburn    • History of chest pain    • History of loop recorder     currently has   • Hypertension    • Migraine    • Mitral valve prolapse    • Palpitations    • PONV (postoperative nausea and vomiting)    • Psoriasis     UNDER NAIL BEDS  ON FEET   • PTTD (posterior tibial tendon dysfunction)    • Raynaud disease    • Sjogren's syndrome (HCC)    • Sleep apnea    • Sleep apnea with use of continuous positive airway pressure (CPAP)    • Urinary incontinence 2019    Usual only if sneezing or coughing or wait to long to go   • Urinary tract infection 9/27/21    Went from first sign of UTI to peeing solid blood in 3 hours       Past Surgical History:  Past Surgical History:   Procedure Laterality Date   • ABDOMINAL SURGERY     • BREAST LUMPECTOMY Left     lumpectomy   • CARDIAC CATHETERIZATION     • CARDIAC ELECTROPHYSIOLOGY PROCEDURE N/A 1/14/2020    Procedure: Loop recorder removal;  Surgeon: Milton Trotter MD;  Location: [facility] INVASIVE LOCATION;  Service: Cardiovascular   • CHOLECYSTECTOMY     • COLONOSCOPY     • D & C AND LAPAROSCOPY     • DIAGNOSTIC LAPAROSCOPY     • LAPAROSCOPIC TUBAL LIGATION     • LASER ABLATION     • SKIN BIOPSY     • TOTAL LAPAROSCOPIC HYSTERECTOMY N/A 12/18/2019    Procedure: TOTAL LAPAROSCOPIC HYSTERECTOMY BILATERAL SALPIGECTOMY WITH DAVINCI SI ROBOT;  Surgeon: Alo Duenas DO;  Location: [facility] OR;  Service: DaVinci   • WISDOM TOOTH EXTRACTION         Medications:    Current Outpatient Medications:   •  albuterol  (PROVENTIL HFA;VENTOLIN HFA) 108 (90 BASE) MCG/ACT inhaler, Inhale 2 puffs every 4 (four) hours as needed for wheezing., Disp: , Rfl:   •  azelastine (ASTELIN) 0.1 % nasal spray, 2 sprays into the nostril(s) as directed by provider 2 (Two) Times a Day As Needed for Rhinitis or Allergies., Disp: , Rfl:   •  Betamethasone Dipropionate (SERNIVO) 0.05 % emulsion, Apply 1 application topically Daily As Needed., Disp: , Rfl:   •  cefdinir (OMNICEF) 300 MG capsule, Take 1 capsule by mouth 2 (Two) Times a Day., Disp: 14 capsule, Rfl: 0  •  cetirizine (zyrTEC) 5 MG tablet, Take 5 mg by mouth Every Morning., Disp: , Rfl:   •  clobetasol (TEMOVATE) 0.05 % external solution, Apply 1 application topically to the appropriate area as directed 2 (Two) Times a Day., Disp: , Rfl:   •  Clocortolone Pivalate 0.1 % cream, Apply 1 application topically to the appropriate area as directed 2 (Two) Times a Day As Needed., Disp: , Rfl:   •  coenzyme Q10 100 MG capsule, Take 200 mg by mouth Daily., Disp: , Rfl:   •  cyanocobalamin 1000 MCG/ML injection, Inject 1,000 mcg under the skin into the appropriate area as directed Every 30 (Thirty) Days., Disp: , Rfl: 3  •  cyclobenzaprine (FLEXERIL) 5 MG tablet, Take 5 mg by mouth 3 (Three) Times a Day As Needed for Muscle Spasms., Disp: , Rfl:   •  DULoxetine (CYMBALTA) 60 MG capsule, Take 60 mg by mouth Daily., Disp: , Rfl: 1  •  fluticasone (FLONASE) 50 MCG/ACT nasal spray, 2 sprays into the nostril(s) as directed by provider Daily As Needed for Rhinitis or Allergies., Disp: , Rfl:   •  hydroxychloroquine (PLAQUENIL) 200 MG tablet, Take 200 mg by mouth 2 (Two) Times a Day., Disp: , Rfl: 1  •  Ivermectin (SOOLANTRA) 1 % cream, Apply 1 application topically Every Night., Disp: , Rfl:   •  levocetirizine (XYZAL) 5 MG tablet, Take 5 mg by mouth Every Night., Disp: , Rfl:   •  montelukast (SINGULAIR) 10 MG tablet, Take 10 mg by mouth Daily., Disp: , Rfl: 3  •  multivitamin (DAILY RON) tablet tablet,  "Take 1 tablet by mouth Daily., Disp: , Rfl:   •  naratriptan (AMERGE) 2.5 MG tablet, Take 2.5 mg by mouth Daily As Needed for Headache or Migraine., Disp: , Rfl: 5  •  omalizumab (XOLAIR) 150 MG injection, Inject 300 mg under the skin into the appropriate area as directed 1 (One) Time., Disp: , Rfl:   •  Omega-3 Fatty Acids (FISH OIL) 1000 MG capsule capsule, Take 1,000 mg by mouth Daily With Breakfast., Disp: , Rfl:   •  Oxymetazoline HCl (RHOFADE) 1 % cream, Apply 1 application topically Daily As Needed., Disp: , Rfl:   •  pantoprazole (PROTONIX) 40 MG EC tablet, Take 40 mg by mouth Daily As Needed., Disp: , Rfl: 3  •  PULMICORT FLEXHALER 180 MCG/ACT inhaler, Inhale 1-2 puffs Daily., Disp: , Rfl: 3  •  triamcinolone (KENALOG) 0.1 % cream, Apply 1 application topically to the appropriate area as directed 2 (Two) Times a Day As Needed for Irritation or Rash., Disp: , Rfl:   •  vitamin D (ERGOCALCIFEROL) 00870 UNITS capsule capsule, Take 50,000 Units by mouth 1 (One) Time Per Week., Disp: , Rfl: 4  No current facility-administered medications for this visit.    Allergies:  Allergies   Allergen Reactions   • Avelox [Moxifloxacin] Other (See Comments)     Severe leg pains   • Levaquin [Levofloxacin] Other (See Comments)     Severe leg pains   • Meloxicam Palpitations   • Metaxalone Other (See Comments)     \"skelaxin\"  Flu like symptoms   • Quinolones Other (See Comments)     Severe leg pains   • Bupropion Hcl Rash     \"zyban\"   • Celecoxib Rash       Social History:  Social History     Socioeconomic History   • Marital status:    Tobacco Use   • Smoking status: Former Smoker     Packs/day: 0.50     Years: 10.00     Pack years: 5.00     Types: Cigarettes     Quit date: 2011     Years since quitting: 10.4   • Smokeless tobacco: Never Used   Substance and Sexual Activity   • Alcohol use: No   • Drug use: No   • Sexual activity: Yes     Partners: Male     Birth control/protection: Surgical       Family " "History:  Family History   Problem Relation Age of Onset   • Heart disease Father         Arteriosclerosis   • Heart attack Father    • Hypertension Father    • Kidney disease Father         Not sure all information would be on file   • Heart disease Paternal Grandmother         Arteriosclerosis   • Heart attack Paternal Grandmother    • Cancer Mother         Skin   • Cancer Paternal Grandfather         Stomach cancer   • Cancer Paternal Uncle         Brain cancer       Objective     Physical Exam:   Vital Signs:   Vitals:    10/25/21 1041   BP: 120/70   Weight: 128 kg (282 lb)   Height: 162.6 cm (64\")     Body mass index is 48.41 kg/m².     Physical Exam    Labs:   Brief Urine Lab Results  (Last result in the past 365 days)      Color   Clarity   Blood   Leuk Est   Nitrite   Protein   CREAT   Urine HCG        10/25/21 1054 Yellow   Clear   Negative   Negative   Negative   Negative                 Urine Culture    Urine Culture 10/4/21   Urine Culture <25,000 CFU/mL Normal Urogenital Georgina              Lab Results   Component Value Date    GLUCOSE 128 (H) 09/28/2021    CALCIUM 9.7 09/28/2021     09/28/2021    K 4.0 09/28/2021    CO2 23.8 09/28/2021     09/28/2021    BUN 16 09/28/2021    CREATININE 1.13 (H) 09/28/2021    EGFRIFNONA 52 (L) 09/28/2021    BCR 14.2 09/28/2021    ANIONGAP 12.2 09/28/2021       Lab Results   Component Value Date    WBC 11.21 (H) 09/28/2021    HGB 14.2 09/28/2021    HCT 42.3 09/28/2021    MCV 90.4 09/28/2021     09/28/2021       Images:   CT Abdomen Pelvis Without Contrast    Result Date: 9/28/2021  No urinary stones are identified Prior hysterectomy Otherwise normal Signer Name: Munir Escobar MD  Signed: 9/28/2021 3:03 AM  Workstation Name: [facility]  [facility]    XR Spine Cervical 2 or 3 View    Result Date: 8/17/2021    No acute findings in the cervical spine.  This report was finalized on 8/17/2021 9:06 AM by Dr. Hilario Sifuentes MD.      XR " "Spine Lumbar AP & Lateral    Result Date: 8/17/2021    No acute findings in the lumbar spine.  This report was finalized on 8/17/2021 9:07 AM by Dr. Hilario Sifuentes MD.        Measures:   Tobacco:   Anna Andres  reports that she quit smoking about 10 years ago. Her smoking use included cigarettes. She has a 5.00 pack-year smoking history. She has never used smokeless tobacco..     Urine Incontinence: ( NOUI)  None   Assessment / Plan      Assessment/Plan:   Ms. Andres is a 46 y.o. female who presented today for evaluation of gross hematuria. Her cystoscopy was normal today she will require CT urogram to complete her hematuria work-up. I will have her follow-up in 3 weeks in telehealth to discuss her CT results.    The patient was counseled regarding the possible etiologies, relevant work-up and diagnostic approach for gross hematuria, as well as the relevant risk categories as assigned by the American U[employer] Association guidelines.  I discussed that the definition of microscopic hematuria includes a microscopic urinalysis (not dipstick UA) positive for greater than 3 RBCs per high-powered field under microscopy.  We also discussed that any history of gross hematuria (or visible hematuria) places a patient at higher risk for occult urologic malignancies and necessitates prompt workup.  We discussed the aforementioned risk categories as assigned by the AUA, which are depicted below.  Ultimately, work-up is mandatory for gross or visible hematuria, as indicated by the \"HIGH RISK\" category and recommendations as depicted by the AUA Guidelines.  Given the patient's age, previous smoking history; the patient is deemed HIGH risk, and for these reasons I recommend proceeding with a flexible diagnostic cystoscopy and CT urogram to assess the collecting system of the kidney and bladder.                Diagnoses and all orders for this visit:    1. Frequency of urination (Primary)  -     POC Urinalysis Dipstick, " Automated    2. Recurrent UTI  -     Urine Culture - Urine, Urine, Random Void    3. Aftercare following surgery of the genitourinary system  -     gentamicin (GARAMYCIN) injection 80 mg    4. Gross hematuria  -     CT Abdomen Pelvis With & Without Contrast; Future           Follow Up:   Return in about 3 weeks (around 11/15/2021) for Video visit.    I spent approximately 15 minutes providing clinical care for this patient; including review of patient's chart and provider documentation, face to face time spent with patient in examination room (obtaining history, performing physical exam, discussing diagnosis and management options), placing orders, and completing patient documentation.     Mattie Fernandez MD  [facility]

## 2021-10-26 LAB — BACTERIA SPEC AEROBE CULT: NO GROWTH

## 2021-11-09 ENCOUNTER — HOSPITAL ENCOUNTER (OUTPATIENT)
Dept: CT IMAGING | Facility: HOSPITAL | Age: 46
Discharge: HOME | End: 2021-11-09

## 2021-11-09 ENCOUNTER — LAB (OUTPATIENT)
Dept: LAB | Facility: HOSPITAL | Age: 46
End: 2021-11-09

## 2021-11-09 DIAGNOSIS — R31.0 GROSS HEMATURIA: ICD-10-CM

## 2021-11-09 DIAGNOSIS — R00.2 HEART PALPITATIONS: ICD-10-CM

## 2021-11-09 DIAGNOSIS — R00.2 PALPITATIONS: ICD-10-CM

## 2021-11-09 LAB
CHOLEST SERPL-MCNC: 234 MG/DL (ref 0–200)
CREAT BLDA-MCNC: 0.9 MG/DL (ref 0.6–1.3)
HDLC SERPL-MCNC: 56 MG/DL (ref 40–60)
LDLC SERPL CALC-MCNC: 150 MG/DL (ref 0–100)
LDLC/HDLC SERPL: 2.61 {RATIO}
TRIGL SERPL-MCNC: 158 MG/DL (ref 0–150)
VLDLC SERPL-MCNC: 28 MG/DL (ref 5–40)

## 2021-11-09 PROCEDURE — 25010000002 IOPAMIDOL 61 % SOLUTION: Performed by: UROLOGY

## 2021-11-09 PROCEDURE — 74178 CT ABD&PLV WO CNTR FLWD CNTR: CPT

## 2021-11-09 PROCEDURE — 36415 COLL VENOUS BLD VENIPUNCTURE: CPT

## 2021-11-09 PROCEDURE — 80061 LIPID PANEL: CPT

## 2021-11-09 PROCEDURE — 74178 CT ABD&PLV WO CNTR FLWD CNTR: CPT | Performed by: RADIOLOGY

## 2021-11-09 PROCEDURE — 82565 ASSAY OF CREATININE: CPT

## 2021-11-09 RX ADMIN — IOPAMIDOL 80 ML: 612 INJECTION, SOLUTION INTRAVENOUS at 10:26

## 2022-02-14 ENCOUNTER — OFFICE VISIT (OUTPATIENT)
Dept: CARDIOLOGY | Facility: CLINIC | Age: 47
End: 2022-02-14

## 2022-02-14 VITALS
HEIGHT: 64 IN | WEIGHT: 286 LBS | SYSTOLIC BLOOD PRESSURE: 132 MMHG | BODY MASS INDEX: 48.83 KG/M2 | DIASTOLIC BLOOD PRESSURE: 68 MMHG | HEART RATE: 78 BPM

## 2022-02-14 DIAGNOSIS — R00.2 PALPITATIONS: Primary | ICD-10-CM

## 2022-02-14 DIAGNOSIS — I10 HYPERTENSION, ESSENTIAL: ICD-10-CM

## 2022-02-14 PROCEDURE — 99203 OFFICE O/P NEW LOW 30 MIN: CPT | Performed by: SPECIALIST

## 2022-02-14 RX ORDER — DULOXETIN HYDROCHLORIDE 30 MG/1
30 CAPSULE, DELAYED RELEASE ORAL DAILY
COMMUNITY
Start: 2022-01-10 | End: 2022-02-14 | Stop reason: ALTCHOICE

## 2022-02-14 RX ORDER — LISINOPRIL 10 MG/1
10 TABLET ORAL DAILY
COMMUNITY
Start: 2021-12-20

## 2022-02-14 RX ORDER — METHOTREXATE 2.5 MG/1
2.5 TABLET ORAL WEEKLY
COMMUNITY
Start: 2022-02-11

## 2022-02-14 RX ORDER — HYDROCHLOROTHIAZIDE 25 MG/1
25 TABLET ORAL DAILY
COMMUNITY
Start: 2021-12-20

## 2022-02-14 RX ORDER — FOLIC ACID 1 MG/1
1 TABLET ORAL DAILY
COMMUNITY
Start: 2022-01-18

## 2022-02-14 NOTE — PROGRESS NOTES
[facility]   Cardiology Consult Note    Patient Name: Anna Andres  : 1975  Referring Physician: No ref. provider found  Subjective   Subjective     Reason for Consult/ Chief Complaint:   Chief Complaint   Patient presents with   • Rapid Heart Rate       HPI:  Anna Andres is a 46 y.o. female next history of palpitations few years ago.  She had a loop recorder implanted which showed no significant arrhythmias.  No syncopal or presyncopal episode.  Had an episode of increased heart rate which showed up on her watch.  She did not have any symptoms at that time.  No further palpitations.  No syncopal or presyncopal spell.    Review of Systems:   Constitutional no fever,  no weight loss   Skin no rash   Otolaryngeal no difficulty swallowing   Cardiovascular See HPI   Pulmonary no cough, no sputum production   Gastrointestinal no constipation, no diarrhea   Genitourinary no dysuria, no hematuria   Hematologic no easy bruisability, no abnormal bleeding   Musculoskeletal no muscle pain   Neurologic no dizziness, no falls     Personal History     Past Medical History:  Past Medical History:   Diagnosis Date   • Abnormal uterine bleeding (AUB)    • Arthritis    • Asthma    • Fibroid     Breast & uterus   • Fibroids     UTERINE   • Fibromyalgia    • GERD (gastroesophageal reflux disease)    • Heart palpitations    • Heartburn    • History of chest pain    • History of loop recorder     currently has   • Hypertension    • Migraine    • Mitral valve prolapse    • Palpitations    • PONV (postoperative nausea and vomiting)    • Psoriasis     UNDER NAIL BEDS  ON FEET   • PTTD (posterior tibial tendon dysfunction)    • Raynaud disease    • Sjogren's syndrome (HCC)    • Sleep apnea    • Sleep apnea with use of continuous positive airway pressure (CPAP)    • Urinary incontinence 2019    Usual only if sneezing or coughing or wait to long to go   • Urinary tract infection 21    Went from first sign of  "UTI to peeing solid blood in 3 hours       Family History:   Family History   Problem Relation Age of Onset   • Heart disease Father         Arteriosclerosis   • Heart attack Father    • Hypertension Father    • Kidney disease Father         Not sure all information would be on file   • Heart disease Paternal Grandmother         Arteriosclerosis   • Heart attack Paternal Grandmother    • Cancer Mother         Skin   • Cancer Paternal Grandfather         Stomach cancer   • Cancer Paternal Uncle         Brain cancer       Social History:  reports that she quit smoking about 11 years ago. Her smoking use included cigarettes. She has a 5.00 pack-year smoking history. She has never used smokeless tobacco. She reports that she does not drink alcohol and does not use drugs.    Home Medications:  Betamethasone Dipropionate, Clocortolone Pivalate, DULoxetine, Ivermectin, Naltrexone HCl, Oxymetazoline HCl, albuterol sulfate HFA, azelastine, budesonide, cefdinir, cetirizine, clobetasol, coenzyme Q10, cyanocobalamin, cyclobenzaprine, fish oil, fluticasone, folic acid, hydroCHLOROthiazide, hydroxychloroquine, levocetirizine, lisinopril, methotrexate, montelukast, multivitamin, naratriptan, omalizumab, pantoprazole, triamcinolone, and vitamin D    Allergies:  Allergies   Allergen Reactions   • Avelox [Moxifloxacin] Other (See Comments)     Severe leg pains   • Levaquin [Levofloxacin] Other (See Comments)     Severe leg pains   • Meloxicam Palpitations   • Metaxalone Other (See Comments)     \"skelaxin\"  Flu like symptoms   • Quinolones Other (See Comments)     Severe leg pains   • Bupropion Hcl Rash     \"zyban\"   • Celecoxib Rash       Objective    Objective     Vitals:   Heart Rate:  [78] 78  BP: (132)/(68) 132/68  Body mass index is 49.09 kg/m².  Physical Exam:   Constitutional: Awake, alert, No acute distress    Eyes: PERRLA, sclerae anicteric, no conjunctival injection   HENT: NCAT, mucous membranes moist   Neck: Supple, no " thyromegaly, no lymphadenopathy, trachea midline   Respiratory: Clear to auscultation bilaterally, nonlabored respirations    Cardiovascular: RRR, no murmurs or rubs. Palpable pedal pulses bilaterally   Gastrointestinal: Positive bowel sounds, soft, nontender, nondistended   Musculoskeletal: No bilateral ankle edema, no clubbing or cyanosis to extremities   Psychiatric: Appropriate affect, cooperative   Neurologic: Oriented x 3, strength symmetric in all extremities, Cranial Nerves grossly intact to confrontation, speech clear   Skin: No rashes     Result Review    Result Review:  I have personally reviewed the available results:  [x]  Laboratory  [x]  EKG/Telemetry   [x]  Cardiology/Vascular   [x] Medications  [x]  Old records  Lab Results   Component Value Date    CHOL 234 (H) 11/09/2021    CHOL 203 (H) 12/10/2019    CHOL 222 (H) 03/10/2018     Lab Results   Component Value Date    TRIG 158 (H) 11/09/2021    TRIG 79 12/10/2019    TRIG 65 03/10/2018     Lab Results   Component Value Date    HDL 56 11/09/2021    HDL 53 12/10/2019    HDL 64 03/10/2018     Lab Results   Component Value Date     (H) 11/09/2021     (H) 12/10/2019     (H) 03/10/2018     Lab Results   Component Value Date    VLDL 28 11/09/2021    VLDL 15.8 12/10/2019    VLDL 13 03/10/2018      @RESUFAST(ALT:3)  Results for orders placed during the hospital encounter of 02/15/19    Adult Transthoracic Echo Complete W/ Cont if Necessary Per Protocol    Interpretation Summary  · Normal left ventricular cavity size and wall thickness noted. All left ventricular wall segments contract normally.  · Estimated EF appears to be in the range of 61 - 65%.  · The aortic valve is structurally normal. Mild aortic valve regurgitation is present. No aortic valve stenosis is present.  · The mitral valve is normal in structure. Trace mitral valve regurgitation is present. No significant mitral valve stenosis is present.  · The tricuspid valve is  normal. No tricuspid valve stenosis is present. Mild tricuspid valve regurgitation is present. Estimated right ventricular systolic pressure from tricuspid regurgitation is normal (<35 mmHg).  · There is no evidence of pericardial effusion.      Procedures     Impression/Plan  1.  Palpitations/tachycardia: 24-hour Holter to evaluate for any significant arrhythmias.  Low caffeine diet advised.  Echocardiogram to evaluate any progression of her valvular heart disease.  2.  Mild nonrheumatic aortic valve regurgitation: Asymptomatic.  3.  Essential hypertension controlled: Continue lisinopril 10 mg a day.  Monitor blood pressure regularly.        Electronically signed by Zander Franklin MD, 02/14/22, 11:47 AM EST.

## 2022-02-22 ENCOUNTER — TELEPHONE (OUTPATIENT)
Dept: CARDIOLOGY | Facility: CLINIC | Age: 47
End: 2022-02-22

## 2022-02-22 NOTE — TELEPHONE ENCOUNTER
----- Message from [facility] Child sent at 2/22/2022  3:26 PM EST -----  Notify pt holter result: Maximum heart rate was 106.  Minimum heart rate was 53 with average heart rate of 76.  Occasional PACs noted,less than 1% of all beats.  There is no significant tach or bradycardia arrhythmias.  Echo pending

## 2023-02-17 NOTE — PROGRESS NOTES
[facility]  Cardiology progress Note    Patient Name: Anna Andres  : 1975    CHIEF COMPLAINT  Palpitations        Subjective   Subjective     HISTORY OF PRESENT ILLNESS    Anna Andres is a 47 y.o. female with history of palpitations and hypertension.  No further palpitations.    REVIEW OF SYSTEMS    Constitutional:    No fever, no weight loss  Skin:     No rash  Otolaryngeal:    No difficulty swallowing  Cardiovascular: See HPI.  Pulmonary:    No cough, no sputum production    Personal History     Social History:    reports that she quit smoking about 12 years ago. Her smoking use included cigarettes. She has a 5.00 pack-year smoking history. She has never used smokeless tobacco. She reports that she does not drink alcohol and does not use drugs.    Home Medications:  Current Outpatient Medications on File Prior to Visit   Medication Sig   • albuterol (PROVENTIL HFA;VENTOLIN HFA) 108 (90 BASE) MCG/ACT inhaler Inhale 2 puffs every 4 (four) hours as needed for wheezing.   • azelastine (ASTELIN) 0.1 % nasal spray 2 sprays into the nostril(s) as directed by provider 2 (Two) Times a Day As Needed for Rhinitis or Allergies.   • Beclomethasone Diprop HFA (Qvar RediHaler) 80 MCG/ACT inhaler    • Beclomethasone Dipropionate (QVAR IN) Qvar RediHaler 80 mcg/actuation HFA breath activated aerosol   • Betamethasone Dipropionate 0.05 % emulsion Apply 1 application topically Daily As Needed.   • Certolizumab Pegol (Cimzia) 2 X 200 MG kit    • cetirizine (zyrTEC) 5 MG tablet Take 5 mg by mouth Every Morning.   • clobetasol (TEMOVATE) 0.05 % external solution Apply 1 application topically to the appropriate area as directed 2 (Two) Times a Day.   • Clocortolone Pivalate 0.1 % cream Apply 1 application topically to the appropriate area as directed 2 (Two) Times a Day As Needed.   • coenzyme Q10 100 MG capsule Take 200 mg by mouth Daily.   • cyanocobalamin 1000 MCG/ML injection Inject 1,000 mcg under  the skin into the appropriate area as directed Every 21 (Twenty-One) Days.   • Dextromethorphan-guaiFENesin (ROBITUSSIN DM PO) Take  by mouth.   • DULoxetine (CYMBALTA) 60 MG capsule Take 60 mg by mouth Daily.   • EPINEPHrine (EPIPEN) 0.3 MG/0.3ML solution auto-injector injection epinephrine 0.3 mg/0.3 mL injection, auto-injector   • fluticasone (FLONASE) 50 MCG/ACT nasal spray 2 sprays into the nostril(s) as directed by provider Daily As Needed for Rhinitis or Allergies.   • folic acid (FOLVITE) 1 MG tablet Take 1 mg by mouth Daily.   • hydroCHLOROthiazide (HYDRODIURIL) 25 MG tablet Take 25 mg by mouth Daily.   • hydroxychloroquine (PLAQUENIL) 200 MG tablet Take 200 mg by mouth 2 (Two) Times a Day.   • levocetirizine (XYZAL) 5 MG tablet Take 5 mg by mouth Every Night.   • lisinopril (PRINIVIL,ZESTRIL) 10 MG tablet Take 10 mg by mouth Daily.   • loratadine (CLARITIN) 10 MG tablet Take 10 mg by mouth Daily.   • Magnesium Oxide 400 MG capsule Take  by mouth.   • methotrexate 2.5 MG tablet Take 2.5 mg by mouth 1 (One) Time Per Week. 3 doses weekly   • montelukast (SINGULAIR) 10 MG tablet Take 10 mg by mouth Daily.   • multivitamin (DAILY RON) tablet tablet Take 1 tablet by mouth Daily.   • naproxen (NAPROSYN) 500 MG tablet naproxen 500 mg tablet   • naratriptan (AMERGE) 2.5 MG tablet Take 2.5 mg by mouth Daily As Needed for Headache or Migraine.   • omalizumab (XOLAIR) 150 MG injection Inject 300 mg under the skin into the appropriate area as directed 1 (One) Time.   • Omega-3 Fatty Acids (FISH OIL) 1000 MG capsule capsule Take 1,000 mg by mouth Daily With Breakfast.   • Oxymetazoline HCl 1 % cream Apply 1 application topically Daily As Needed.   • pantoprazole (PROTONIX) 40 MG EC tablet pantoprazole 40 mg tablet,delayed release   TAKE 1 TABLET BY MOUTH EVERY DAY   • Restasis 0.05 % ophthalmic emulsion    • triamcinolone (KENALOG) 0.1 % cream Apply 1 application topically to the appropriate area as directed 2 (Two)  "Times a Day As Needed for Irritation or Rash.   • vitamin D (ERGOCALCIFEROL) 80410 UNITS capsule capsule Take 50,000 Units by mouth 1 (One) Time Per Week.   • [DISCONTINUED] NALTREXONE HCL PO    • [DISCONTINUED] cefdinir (OMNICEF) 300 MG capsule Take 1 capsule by mouth 2 (Two) Times a Day.   • [DISCONTINUED] cyclobenzaprine (FLEXERIL) 5 MG tablet Take 5 mg by mouth 3 (Three) Times a Day As Needed for Muscle Spasms.   • [DISCONTINUED] Ivermectin 1 % cream Apply 1 application topically Every Night.   • [DISCONTINUED] PULMICORT FLEXHALER 180 MCG/ACT inhaler Inhale 1-2 puffs Daily.     No current facility-administered medications on file prior to visit.       Past Medical History:   Diagnosis Date   • Abnormal uterine bleeding (AUB)    • Arthritis    • Asthma    • Fibroid 2008    Breast & uterus   • Fibroids     UTERINE   • Fibromyalgia    • GERD (gastroesophageal reflux disease)    • Heart palpitations    • Heartburn    • History of chest pain    • History of loop recorder     currently has   • Hypertension    • Migraine    • Mitral valve prolapse    • Palpitations    • PONV (postoperative nausea and vomiting)    • Psoriasis     UNDER NAIL BEDS  ON FEET   • PTTD (posterior tibial tendon dysfunction)    • Raynaud disease    • Sjogren's syndrome (HCC)    • Sleep apnea    • Sleep apnea with use of continuous positive airway pressure (CPAP)    • Urinary incontinence 2019    Usual only if sneezing or coughing or wait to long to go   • Urinary tract infection 9/27/21    Went from first sign of UTI to peeing solid blood in 3 hours       Allergies:  Allergies   Allergen Reactions   • Avelox [Moxifloxacin] Other (See Comments)     Severe leg pains   • Levaquin [Levofloxacin] Other (See Comments)     Severe leg pains   • Meloxicam Palpitations   • Metaxalone Other (See Comments)     \"skelaxin\"  Flu like symptoms   • Quinolones Other (See Comments)     Severe leg pains   • Bupropion Hcl Rash     \"zyban\"   • Celecoxib Rash "       Objective    Objective       Vitals:   Heart Rate:  [79] 79  BP: (119)/(63) 119/63  Body mass index is 44.46 kg/m².     PHYSICAL EXAM:    General Appearance:   · well developed  · well nourished  HENT:   · oropharynx moist  · lips not cyanotic  Neck:  · thyroid not enlarged  · supple  Respiratory:  · no respiratory distress  · normal breath sounds  · no rales  Cardiovascular:  · no jugular venous distention  · regular rhythm  · apical impulse normal  · S1 normal, S2 normal  · no S3, no S4   · no murmur  · no rub, no thrill  · carotid pulses normal; no bruit  · pedal pulses normal  · lower extremity edema: none    Skin:   · warm, dry  Psychiatric:  · judgement and insight appropriate  · normal mood and affect        Result Review:  I have personally reviewed the available results from  [x]  Laboratory  [x]  EKG  [x]  Cardiology  [x]  Medications  [x]  Old records  []  Other:     Procedures  Lab Results   Component Value Date    CHOL 234 (H) 11/09/2021    CHOL 203 (H) 12/10/2019    CHOL 222 (H) 03/10/2018     Lab Results   Component Value Date    TRIG 158 (H) 11/09/2021    TRIG 79 12/10/2019    TRIG 65 03/10/2018     Lab Results   Component Value Date    HDL 56 11/09/2021    HDL 53 12/10/2019    HDL 64 03/10/2018     Lab Results   Component Value Date     (H) 11/09/2021     (H) 12/10/2019     (H) 03/10/2018     Lab Results   Component Value Date    VLDL 28 11/09/2021    VLDL 15.8 12/10/2019    VLDL 13 03/10/2018     Results for orders placed in visit on 03/21/22    Adult Transthoracic Echo Complete W/ Cont if Necessary Per Protocol    Interpretation Summary  Normal left ventricular systolic function.  Trace MR and trace TR.     Impression/Plan:  1.  Essential hypertension controlled: Continue lisinopril 10 mg once a day.  Monitor blood pressure regularly.  2.  Palpitations/tachycardia: Holter within normal limits.  No further palpitations or tachycardia          Zander Franklin MD    02/20/23   10:34 EST

## 2023-02-20 ENCOUNTER — OFFICE VISIT (OUTPATIENT)
Dept: CARDIOLOGY | Facility: CLINIC | Age: 48
End: 2023-02-20
Payer: COMMERCIAL

## 2023-02-20 VITALS
BODY MASS INDEX: 44.22 KG/M2 | HEART RATE: 79 BPM | SYSTOLIC BLOOD PRESSURE: 119 MMHG | HEIGHT: 64 IN | DIASTOLIC BLOOD PRESSURE: 63 MMHG | WEIGHT: 259 LBS

## 2023-02-20 DIAGNOSIS — I10 HYPERTENSION, ESSENTIAL: ICD-10-CM

## 2023-02-20 DIAGNOSIS — R00.2 PALPITATIONS: Primary | ICD-10-CM

## 2023-02-20 PROCEDURE — 99213 OFFICE O/P EST LOW 20 MIN: CPT | Performed by: SPECIALIST

## 2023-02-20 RX ORDER — CALCIUM CARBONATE/VITAMIN D3 500-10/5ML
LIQUID (ML) ORAL
COMMUNITY

## 2023-02-20 RX ORDER — LORATADINE 10 MG/1
10 TABLET ORAL DAILY
COMMUNITY

## 2023-02-20 RX ORDER — BECLOMETHASONE DIPROPIONATE HFA 80 UG/1
AEROSOL, METERED RESPIRATORY (INHALATION)
COMMUNITY
Start: 2022-01-13

## 2023-02-20 RX ORDER — NAPROXEN 500 MG/1
TABLET ORAL
COMMUNITY

## 2023-02-20 RX ORDER — CERTOLIZUMAB PEGOL 400 MG
KIT SUBCUTANEOUS
COMMUNITY
Start: 2022-11-13

## 2023-02-20 RX ORDER — PANTOPRAZOLE SODIUM 40 MG/1
TABLET, DELAYED RELEASE ORAL
COMMUNITY

## 2023-02-20 RX ORDER — CYCLOSPORINE 0.5 MG/ML
EMULSION OPHTHALMIC
COMMUNITY
Start: 2022-12-21

## 2023-02-20 RX ORDER — EPINEPHRINE 0.3 MG/.3ML
INJECTION SUBCUTANEOUS
COMMUNITY

## 2023-08-29 ENCOUNTER — TRANSCRIBE ORDERS (OUTPATIENT)
Dept: ADMINISTRATIVE | Facility: HOSPITAL | Age: 48
End: 2023-08-29
Payer: COMMERCIAL

## 2023-08-29 DIAGNOSIS — Z12.31 VISIT FOR SCREENING MAMMOGRAM: Primary | ICD-10-CM

## 2023-11-27 ENCOUNTER — HOSPITAL ENCOUNTER (OUTPATIENT)
Dept: MAMMOGRAPHY | Facility: HOSPITAL | Age: 48
Discharge: HOME | End: 2023-11-27
Admitting: NURSE PRACTITIONER
Payer: COMMERCIAL

## 2023-11-27 DIAGNOSIS — Z12.31 VISIT FOR SCREENING MAMMOGRAM: ICD-10-CM

## 2023-11-27 PROCEDURE — 77067 SCR MAMMO BI INCL CAD: CPT

## 2023-11-27 PROCEDURE — 77063 BREAST TOMOSYNTHESIS BI: CPT

## 2023-11-28 ENCOUNTER — TELEPHONE (OUTPATIENT)
Dept: MAMMOGRAPHY | Facility: HOSPITAL | Age: 48
End: 2023-11-28
Payer: COMMERCIAL

## 2023-11-28 NOTE — TELEPHONE ENCOUNTER
Called patient to schedule diagnostic imaging recommended from screening mammogram.  Patient stated she wanted to go closer to home for this exam and would be having additional imaging done in [place], [place].

## 2023-12-27 ENCOUNTER — TRANSCRIBE ORDERS (OUTPATIENT)
Dept: ADMINISTRATIVE | Facility: HOSPITAL | Age: 48
End: 2023-12-27
Payer: COMMERCIAL

## 2023-12-27 DIAGNOSIS — R92.8 ABNORMALITY OF BREAST ON SCREENING MAMMOGRAPHY: Primary | ICD-10-CM

## 2024-01-22 ENCOUNTER — HOSPITAL ENCOUNTER (OUTPATIENT)
Dept: MRI IMAGING | Facility: HOSPITAL | Age: 49
Discharge: HOME | End: 2024-01-22
Admitting: NURSE PRACTITIONER
Payer: COMMERCIAL

## 2024-01-22 DIAGNOSIS — R92.8 ABNORMALITY OF BREAST ON SCREENING MAMMOGRAPHY: ICD-10-CM

## 2024-01-22 LAB
CREAT BLDA-MCNC: 0.9 MG/DL (ref 0.6–1.3)
EGFRCR SERPLBLD CKD-EPI 2021: 79 ML/MIN/1.73

## 2024-01-22 PROCEDURE — 77049 MRI BREAST C-+ W/CAD BI: CPT

## 2024-01-22 PROCEDURE — 0 GADOBENATE DIMEGLUMINE 529 MG/ML SOLUTION: Performed by: NURSE PRACTITIONER

## 2024-01-22 PROCEDURE — 82565 ASSAY OF CREATININE: CPT

## 2024-01-22 PROCEDURE — A9577 INJ MULTIHANCE: HCPCS | Performed by: NURSE PRACTITIONER

## 2024-01-22 RX ADMIN — GADOBENATE DIMEGLUMINE 20 ML: 529 INJECTION, SOLUTION INTRAVENOUS at 13:58

## 2024-02-20 ENCOUNTER — TRANSCRIBE ORDERS (OUTPATIENT)
Dept: ADMINISTRATIVE | Facility: HOSPITAL | Age: 49
End: 2024-02-20
Payer: COMMERCIAL

## 2024-02-20 DIAGNOSIS — R92.8 ABNORMAL MAMMOGRAM: Primary | ICD-10-CM

## 2024-02-21 NOTE — PROGRESS NOTES
[facility]  Cardiology progress Note    Patient Name: Anna Andres  : 1975    CHIEF COMPLAINT  Palpitations        Subjective   Subjective     HISTORY OF PRESENT ILLNESS    Anna Andres is a 48 y.o. female with history of palpitations and hypertension.  No further palpitations    REVIEW OF SYSTEMS    Constitutional:    No fever, no weight loss  Skin:     No rash  Otolaryngeal:    No difficulty swallowing  Cardiovascular: See HPI.  Pulmonary:    No cough, no sputum production    Personal History     Social History:    reports that she quit smoking about 12 years ago. Her smoking use included cigarettes. She has a 5.00 pack-year smoking history. She has never used smokeless tobacco. She reports that she does not drink alcohol and does not use drugs.    Home Medications:  Current Outpatient Medications on File Prior to Visit   Medication Sig    albuterol (PROVENTIL HFA;VENTOLIN HFA) 108 (90 BASE) MCG/ACT inhaler Inhale 2 puffs Every 4 (Four) Hours As Needed for Wheezing.    atorvastatin (LIPITOR) 10 MG tablet Take 1 tablet by mouth Daily.    azelastine (ASTELIN) 0.1 % nasal spray 2 sprays into the nostril(s) as directed by provider 2 (Two) Times a Day As Needed for Rhinitis or Allergies.    budesonide (Pulmicort Flexhaler) 90 MCG/ACT inhaler Inhale 1 puff 2 (Two) Times a Day.    clobetasol (TEMOVATE) 0.05 % external solution Apply 1 Application topically to the appropriate area as directed 2 (Two) Times a Day.    Clocortolone Pivalate 0.1 % cream Apply 1 application topically to the appropriate area as directed 2 (Two) Times a Day As Needed.    coenzyme Q10 100 MG capsule Take 2 capsules by mouth Daily.    cyanocobalamin 1000 MCG/ML injection Inject 1 mL under the skin into the appropriate area as directed Every 21 (Twenty-One) Days.    Dextromethorphan-guaiFENesin (ROBITUSSIN DM PO) Take  by mouth.    DULoxetine (CYMBALTA) 60 MG capsule Take 1 capsule by mouth Daily.    EPINEPHrine  (EPIPEN) 0.3 MG/0.3ML solution auto-injector injection epinephrine 0.3 mg/0.3 mL injection, auto-injector    fluticasone (FLONASE) 50 MCG/ACT nasal spray 2 sprays into the nostril(s) as directed by provider Daily As Needed for Rhinitis or Allergies.    folic acid (FOLVITE) 1 MG tablet Take 1 tablet by mouth Daily.    golimumab (Simponi Aria) 50 MG/4ML solution injection Infuse 4 mL into a venous catheter Every 2 (Two) Months.    hydroCHLOROthiazide (HYDRODIURIL) 25 MG tablet Take 1 tablet by mouth Daily.    hydroxychloroquine (PLAQUENIL) 200 MG tablet Take 1 tablet by mouth 2 (Two) Times a Day.    lisinopril (PRINIVIL,ZESTRIL) 10 MG tablet Take 1 tablet by mouth Daily.    loratadine (CLARITIN) 10 MG tablet Take 1 tablet by mouth Daily.    Magnesium Oxide 400 MG capsule Take  by mouth.    methotrexate 2.5 MG tablet Take 1 tablet by mouth 1 (One) Time Per Week. 3 doses weekly    montelukast (SINGULAIR) 10 MG tablet Take 1 tablet by mouth Daily.    multivitamin (DAILY RON) tablet tablet Take 1 tablet by mouth Daily.    naproxen (NAPROSYN) 500 MG tablet naproxen 500 mg tablet    naratriptan (AMERGE) 2.5 MG tablet Take 1 tablet by mouth Daily As Needed for Headache or Migraine.    omalizumab (XOLAIR) 150 MG injection Inject 2.4 mL under the skin into the appropriate area as directed 1 (One) Time.    Omega-3 Fatty Acids (FISH OIL) 1000 MG capsule capsule Take 1 capsule by mouth Daily With Breakfast.    Oxymetazoline HCl 1 % cream Apply 1 application topically Daily As Needed.    Restasis 0.05 % ophthalmic emulsion     triamcinolone (KENALOG) 0.1 % cream Apply 1 Application topically to the appropriate area as directed 2 (Two) Times a Day As Needed for Irritation or Rash.    vitamin D (ERGOCALCIFEROL) 10784 UNITS capsule capsule Take 1 capsule by mouth 1 (One) Time Per Week.    Zepbound 7.5 MG/0.5ML solution auto-injector Inject 0.5 mL under the skin into the appropriate area as directed 1 (One) Time Per Week.     "[DISCONTINUED] Beclomethasone Diprop HFA (Qvar RediHaler) 80 MCG/ACT inhaler     [DISCONTINUED] Betamethasone Dipropionate 0.05 % emulsion Apply 1 application topically Daily As Needed.    [DISCONTINUED] Certolizumab Pegol (Cimzia) 2 X 200 MG kit     [DISCONTINUED] levocetirizine (XYZAL) 5 MG tablet Take 5 mg by mouth Every Night.    [DISCONTINUED] pantoprazole (PROTONIX) 40 MG EC tablet pantoprazole 40 mg tablet,delayed release   TAKE 1 TABLET BY MOUTH EVERY DAY     No current facility-administered medications on file prior to visit.       Past Medical History:   Diagnosis Date    Abnormal uterine bleeding (AUB)     Arthritis     Asthma     Fibroid 2008    Breast & uterus    Fibroids     UTERINE    Fibromyalgia     GERD (gastroesophageal reflux disease)     Heart palpitations     Heartburn     History of chest pain     History of loop recorder     currently has    Hypertension     Migraine     Mitral valve prolapse     Palpitations     PONV (postoperative nausea and vomiting)     Psoriasis     UNDER NAIL BEDS  ON FEET    PTTD (posterior tibial tendon dysfunction)     Raynaud disease     Sjogren's syndrome     Sleep apnea     Sleep apnea with use of continuous positive airway pressure (CPAP)     Urinary incontinence 2019    Usual only if sneezing or coughing or wait to long to go    Urinary tract infection 09/27/2021    Went from first sign of UTI to peeing solid blood in 3 hours       Allergies:  Allergies   Allergen Reactions    Avelox [Moxifloxacin] Other (See Comments)     Severe leg pains    Levaquin [Levofloxacin] Other (See Comments)     Severe leg pains    Meloxicam Palpitations    Metaxalone Other (See Comments)     \"skelaxin\"  Flu like symptoms    Quinolones Other (See Comments)     Severe leg pains    Bupropion Hcl Rash     \"zyban\"    Celecoxib Rash       Objective    Objective       Vitals:   Heart Rate:  [80] 80  BP: (112)/(76) 112/76  Body mass index is 41.2 kg/m².     PHYSICAL EXAM:    General " Appearance:   well developed  well nourished  HENT:   oropharynx moist  lips not cyanotic  Neck:  thyroid not enlarged  supple  Respiratory:  no respiratory distress  normal breath sounds  no rales  Cardiovascular:  no jugular venous distention  regular rhythm  apical impulse normal  S1 normal, S2 normal  no S3, no S4   no murmur  no rub, no thrill  carotid pulses normal; no bruit  pedal pulses normal  lower extremity edema: none    Skin:   warm, dry  Psychiatric:  judgement and insight appropriate  normal mood and affect        Result Review:  I have personally reviewed the available results from  [x]  Laboratory  [x]  EKG  [x]  Cardiology  [x]  Medications  [x]  Old records  []  Other:     Procedures  Lab Results   Component Value Date    CHOL 234 (H) 11/09/2021    CHOL 203 (H) 12/10/2019    CHOL 222 (H) 03/10/2018     Lab Results   Component Value Date    TRIG 158 (H) 11/09/2021    TRIG 79 12/10/2019    TRIG 65 03/10/2018     Lab Results   Component Value Date    HDL 56 11/09/2021    HDL 53 12/10/2019    HDL 64 03/10/2018     Lab Results   Component Value Date     (H) 11/09/2021     (H) 12/10/2019     (H) 03/10/2018     Lab Results   Component Value Date    VLDL 28 11/09/2021    VLDL 15.8 12/10/2019    VLDL 13 03/10/2018     Results for orders placed in visit on 03/21/22    Adult Transthoracic Echo Complete W/ Cont if Necessary Per Protocol    Interpretation Summary  Normal left ventricular systolic function.  Trace MR and trace TR.     Impression/Plan:  1.  Palpitations/tachycardia: Stable and improved.  2.  Essential hypertension controlled: Continue lisinopril 10 mg once a day.  Monitor blood pressure regularly           Zander Franklin MD   02/26/24   10:20 EST

## 2024-02-26 ENCOUNTER — OFFICE VISIT (OUTPATIENT)
Dept: CARDIOLOGY | Facility: CLINIC | Age: 49
End: 2024-02-26
Payer: COMMERCIAL

## 2024-02-26 VITALS
HEART RATE: 80 BPM | DIASTOLIC BLOOD PRESSURE: 76 MMHG | WEIGHT: 240 LBS | SYSTOLIC BLOOD PRESSURE: 112 MMHG | HEIGHT: 64 IN | BODY MASS INDEX: 40.97 KG/M2

## 2024-02-26 DIAGNOSIS — R00.2 PALPITATIONS: Primary | ICD-10-CM

## 2024-02-26 DIAGNOSIS — I10 HYPERTENSION, ESSENTIAL: ICD-10-CM

## 2024-02-26 PROCEDURE — 99213 OFFICE O/P EST LOW 20 MIN: CPT | Performed by: SPECIALIST

## 2024-02-26 RX ORDER — TIRZEPATIDE 7.5 MG/.5ML
7.5 INJECTION, SOLUTION SUBCUTANEOUS WEEKLY
COMMUNITY
Start: 2023-12-29

## 2024-02-26 RX ORDER — BUDESONIDE 90 UG/1
1 AEROSOL, POWDER RESPIRATORY (INHALATION) 2 TIMES DAILY
COMMUNITY

## 2024-02-26 RX ORDER — GOLIMUMAB 50 MG/4ML
4 SOLUTION INTRAVENOUS
COMMUNITY
Start: 2023-09-21

## 2024-02-26 RX ORDER — ATORVASTATIN CALCIUM 10 MG/1
10 TABLET, FILM COATED ORAL DAILY
COMMUNITY

## 2024-03-04 ENCOUNTER — HOSPITAL ENCOUNTER (OUTPATIENT)
Dept: ULTRASOUND IMAGING | Facility: HOSPITAL | Age: 49
Discharge: HOME | End: 2024-03-04
Admitting: NURSE PRACTITIONER
Payer: COMMERCIAL

## 2024-03-04 DIAGNOSIS — R92.8 ABNORMAL MAMMOGRAM: ICD-10-CM

## 2024-03-04 PROCEDURE — 76642 ULTRASOUND BREAST LIMITED: CPT

## 2024-03-18 ENCOUNTER — HOSPITAL ENCOUNTER (OUTPATIENT)
Dept: MAMMOGRAPHY | Facility: HOSPITAL | Age: 49
Discharge: HOME | End: 2024-03-18
Payer: COMMERCIAL

## 2024-03-18 ENCOUNTER — PATIENT OUTREACH (OUTPATIENT)
Dept: ONCOLOGY | Facility: HOSPITAL | Age: 49
End: 2024-03-18
Payer: COMMERCIAL

## 2024-03-18 DIAGNOSIS — N63.22 MASS OF UPPER INNER QUADRANT OF LEFT BREAST: ICD-10-CM

## 2024-03-18 PROCEDURE — 19081 BX BREAST 1ST LESION STRTCTC: CPT

## 2024-08-21 ENCOUNTER — TRANSCRIBE ORDERS (OUTPATIENT)
Dept: ADMINISTRATIVE | Facility: HOSPITAL | Age: 49
End: 2024-08-21
Payer: COMMERCIAL

## 2024-08-21 DIAGNOSIS — Z09 FOLLOW-UP EXAM, 3-6 MONTHS SINCE PREVIOUS EXAM: Primary | ICD-10-CM

## 2024-09-19 ENCOUNTER — TRANSCRIBE ORDERS (OUTPATIENT)
Dept: ADMINISTRATIVE | Facility: HOSPITAL | Age: 49
End: 2024-09-19
Payer: COMMERCIAL

## 2024-09-19 DIAGNOSIS — Z09 FOLLOW-UP EXAM, 3-6 MONTHS SINCE PREVIOUS EXAM: Primary | ICD-10-CM

## 2024-10-25 ENCOUNTER — HOSPITAL ENCOUNTER (OUTPATIENT)
Dept: MRI IMAGING | Facility: HOSPITAL | Age: 49
Discharge: HOME | End: 2024-10-25
Admitting: NURSE PRACTITIONER
Payer: COMMERCIAL

## 2024-10-25 DIAGNOSIS — Z09 FOLLOW-UP EXAM, 3-6 MONTHS SINCE PREVIOUS EXAM: ICD-10-CM

## 2024-10-25 PROCEDURE — A9577 INJ MULTIHANCE: HCPCS | Performed by: NURSE PRACTITIONER

## 2024-10-25 PROCEDURE — 0 GADOBENATE DIMEGLUMINE 529 MG/ML SOLUTION: Performed by: NURSE PRACTITIONER

## 2024-10-25 PROCEDURE — 77049 MRI BREAST C-+ W/CAD BI: CPT

## 2024-10-25 RX ADMIN — GADOBENATE DIMEGLUMINE 20 ML: 529 INJECTION, SOLUTION INTRAVENOUS at 14:01

## 2024-11-14 ENCOUNTER — TRANSCRIBE ORDERS (OUTPATIENT)
Dept: ADMINISTRATIVE | Facility: HOSPITAL | Age: 49
End: 2024-11-14
Payer: COMMERCIAL

## 2024-11-14 DIAGNOSIS — Z12.31 BREAST CANCER SCREENING BY MAMMOGRAM: Primary | ICD-10-CM

## 2024-12-28 ENCOUNTER — HOSPITAL ENCOUNTER (OUTPATIENT)
Dept: MAMMOGRAPHY | Facility: HOSPITAL | Age: 49
Discharge: HOME | End: 2024-12-28
Admitting: NURSE PRACTITIONER
Payer: COMMERCIAL

## 2024-12-28 DIAGNOSIS — Z12.31 BREAST CANCER SCREENING BY MAMMOGRAM: ICD-10-CM

## 2024-12-28 PROCEDURE — 77063 BREAST TOMOSYNTHESIS BI: CPT

## 2024-12-28 PROCEDURE — 77067 SCR MAMMO BI INCL CAD: CPT

## 2025-02-19 NOTE — PROGRESS NOTES
[facility]  Cardiology progress Note    Patient Name: Anna Andres  : 1975    CHIEF COMPLAINT  Palpitations        Subjective   Subjective     HISTORY OF PRESENT ILLNESS    Anna Andres is a 49 y.o. female with history of palpitations.  No chest pain or shortness of breath.  Has some atypical chest pain.    REVIEW OF SYSTEMS    Constitutional:    No fever, no weight loss  Skin:     No rash  Otolaryngeal:    No difficulty swallowing  Cardiovascular: See HPI.  Pulmonary:    No cough, no sputum production    Personal History     Social History:    reports that she quit smoking about 13 years ago. Her smoking use included cigarettes. She started smoking about 23 years ago. She has a 5 pack-year smoking history. She has never used smokeless tobacco. She reports that she does not drink alcohol and does not use drugs.    Home Medications:  Current Outpatient Medications on File Prior to Visit   Medication Sig    albuterol (PROVENTIL HFA;VENTOLIN HFA) 108 (90 BASE) MCG/ACT inhaler Inhale 2 puffs Every 4 (Four) Hours As Needed for Wheezing.    atorvastatin (LIPITOR) 10 MG tablet Take 1 tablet by mouth Daily.    azelastine (ASTELIN) 0.1 % nasal spray Administer 2 sprays into the nostril(s) as directed by provider 2 (Two) Times a Day As Needed for Rhinitis or Allergies.    budesonide (Pulmicort Flexhaler) 90 MCG/ACT inhaler Inhale 1 puff 2 (Two) Times a Day.    clobetasol (TEMOVATE) 0.05 % external solution Apply 1 Application topically to the appropriate area as directed 2 (Two) Times a Day.    Clocortolone Pivalate 0.1 % cream Apply 1 application topically to the appropriate area as directed 2 (Two) Times a Day As Needed.    coenzyme Q10 100 MG capsule Take 2 capsules by mouth Daily.    CRANBERRY-D MANNOSE PO     cyanocobalamin 1000 MCG/ML injection Inject 1 mL under the skin into the appropriate area as directed Every 21 (Twenty-One) Days.    cyclobenzaprine (FLEXERIL) 10 MG tablet      dicyclomine (BENTYL) 20 MG tablet     DULoxetine (CYMBALTA) 60 MG capsule Take 1 capsule by mouth Daily.    Dupilumab (Dupixent) 300 MG/2ML solution auto-injector injection     fluticasone (FLONASE) 50 MCG/ACT nasal spray Administer 2 sprays into the nostril(s) as directed by provider Daily As Needed for Rhinitis or Allergies.    folic acid (FOLVITE) 1 MG tablet Take 1 tablet by mouth Daily.    gabapentin (NEURONTIN) 100 MG capsule     golimumab (Simponi Aria) 50 MG/4ML solution injection Infuse 4 mL into a venous catheter Every 2 (Two) Months.    hydroCHLOROthiazide (HYDRODIURIL) 25 MG tablet Take 1 tablet by mouth Daily.    Ivermectin 1 % cream     levocetirizine (Xyzal Allergy 24HR) 5 MG tablet     lisinopril (PRINIVIL,ZESTRIL) 10 MG tablet Take 1 tablet by mouth Daily.    loratadine (CLARITIN) 10 MG tablet Take 1 tablet by mouth Daily.    Magnesium Oxide 400 MG capsule Take  by mouth.    methotrexate 2.5 MG tablet Take 1 tablet by mouth 1 (One) Time Per Week. 3 doses weekly    montelukast (SINGULAIR) 10 MG tablet Take 1 tablet by mouth Daily.    naproxen (NAPROSYN) 500 MG tablet naproxen 500 mg tablet    naratriptan (AMERGE) 2.5 MG tablet Take 1 tablet by mouth Daily As Needed for Headache or Migraine.    Omega-3 Fatty Acids (FISH OIL) 1000 MG capsule capsule Take 1 capsule by mouth Daily With Breakfast.    Oxymetazoline HCl (Rhofade) 1 % cream     pilocarpine (SALAGEN) 5 MG tablet     Restasis 0.05 % ophthalmic emulsion     triamcinolone (KENALOG) 0.1 % cream Apply 1 Application topically to the appropriate area as directed 2 (Two) Times a Day As Needed for Irritation or Rash.    Zepbound 10 MG/0.5ML solution auto-injector     [DISCONTINUED] Oxymetazoline HCl 1 % cream Apply 1 application topically Daily As Needed.    vitamin D (ERGOCALCIFEROL) 91511 UNITS capsule capsule Take 1 capsule by mouth 1 (One) Time Per Week.    [DISCONTINUED] Dextromethorphan-guaiFENesin (ROBITUSSIN DM PO) Take  by mouth.     "[DISCONTINUED] EPINEPHrine (EPIPEN) 0.3 MG/0.3ML solution auto-injector injection epinephrine 0.3 mg/0.3 mL injection, auto-injector    [DISCONTINUED] hydroxychloroquine (PLAQUENIL) 200 MG tablet Take 1 tablet by mouth 2 (Two) Times a Day.    [DISCONTINUED] multivitamin (DAILY RON) tablet tablet Take 1 tablet by mouth Daily.    [DISCONTINUED] omalizumab (XOLAIR) 150 MG injection Inject 2.4 mL under the skin into the appropriate area as directed 1 (One) Time.    [DISCONTINUED] Zepbound 7.5 MG/0.5ML solution auto-injector Inject 0.5 mL under the skin into the appropriate area as directed 1 (One) Time Per Week.     No current facility-administered medications on file prior to visit.       Past Medical History:   Diagnosis Date    Abnormal uterine bleeding (AUB)     Arthritis     Asthma     Fibroid 2008    Breast & uterus    Fibroids     UTERINE    Fibromyalgia     GERD (gastroesophageal reflux disease)     Heart palpitations     Heartburn     History of chest pain     History of loop recorder     currently has    Hyperlipidemia     Hypertension     Migraine     Mitral valve prolapse     Palpitations     PONV (postoperative nausea and vomiting)     Psoriasis     UNDER NAIL BEDS  ON FEET    PTTD (posterior tibial tendon dysfunction)     Raynaud disease     Sjogren's syndrome     Sleep apnea     Sleep apnea with use of continuous positive airway pressure (CPAP)     Urinary incontinence 2019    Usual only if sneezing or coughing or wait to long to go    Urinary tract infection 09/27/2021    Went from first sign of UTI to peeing solid blood in 3 hours       Allergies:  Allergies   Allergen Reactions    Avelox [Moxifloxacin] Other (See Comments)     Severe leg pains    Levaquin [Levofloxacin] Other (See Comments)     Severe leg pains    Meloxicam Palpitations    Metaxalone Other (See Comments)     \"skelaxin\"  Flu like symptoms    Quinolones Other (See Comments)     Severe leg pains    Bupropion Hcl Rash     \"zyban\"    " Celecoxib Rash       Objective    Objective       Vitals:   Heart Rate:  [93] 93  BP: (105)/(72) 105/72  Body mass index is 30.55 kg/m².     PHYSICAL EXAM:    General Appearance:   well developed  well nourished  HENT:   oropharynx moist  lips not cyanotic  Neck:  thyroid not enlarged  supple  Respiratory:  no respiratory distress  normal breath sounds  no rales  Cardiovascular:  no jugular venous distention  regular rhythm  apical impulse normal  S1 normal, S2 normal  no S3, no S4   no murmur  no rub, no thrill  carotid pulses normal; no bruit  pedal pulses normal  lower extremity edema: none    Skin:   warm, dry  Psychiatric:  judgement and insight appropriate  normal mood and affect        Result Review:  I have personally reviewed the available results from  [x]  Laboratory  [x]  EKG  [x]  Cardiology  [x]  Medications  [x]  Old records  []  Other:       ECG 12 Lead    Date/Time: 3/3/2025 10:46 AM  Performed by: Zander Franklin MD    Authorized by: Zander Franklin MD  Comparison: compared with previous ECG   Similar to previous ECG  Rhythm: sinus rhythm  Rate: normal  QRS axis: normal  Comments: Normal sinus rhythm.  No significant changes compared to previous EKG        Lab Results   Component Value Date    CHOL 234 (H) 11/09/2021    CHOL 203 (H) 12/10/2019    CHOL 222 (H) 03/10/2018     Lab Results   Component Value Date    TRIG 158 (H) 11/09/2021    TRIG 79 12/10/2019    TRIG 65 03/10/2018     Lab Results   Component Value Date    HDL 56 11/09/2021    HDL 53 12/10/2019    HDL 64 03/10/2018     Lab Results   Component Value Date     (H) 11/09/2021     (H) 12/10/2019     (H) 03/10/2018     Lab Results   Component Value Date    VLDL 28 11/09/2021    VLDL 15.8 12/10/2019    VLDL 13 03/10/2018     Results for orders placed in visit on 03/21/22    Adult Transthoracic Echo Complete W/ Cont if Necessary Per Protocol    Interpretation Summary  Normal left ventricular systolic  function.  Trace MR and trace TR.     Impression/Plan:  1.  Essential hypertension controlled: Continue lisinopril 10 mg once a day.  Monitor blood pressure regularly.  2.  Palpitations/tachycardia: Stable and improved.  3.  Mixed hyperlipidemia: Continue Lipitor 10 mg once a day.  Monitor lipid and hepatic profile.  4.  Precordial atypical chest pain: Treadmill stress test.      Zander Franklin MD   03/03/25   10:45 EST

## 2025-03-03 ENCOUNTER — OFFICE VISIT (OUTPATIENT)
Dept: CARDIOLOGY | Facility: CLINIC | Age: 50
End: 2025-03-03
Payer: COMMERCIAL

## 2025-03-03 VITALS
SYSTOLIC BLOOD PRESSURE: 105 MMHG | DIASTOLIC BLOOD PRESSURE: 72 MMHG | HEART RATE: 93 BPM | WEIGHT: 178 LBS | BODY MASS INDEX: 30.39 KG/M2 | HEIGHT: 64 IN

## 2025-03-03 DIAGNOSIS — R00.2 PALPITATIONS: ICD-10-CM

## 2025-03-03 DIAGNOSIS — R07.9 CHEST PAIN, UNSPECIFIED TYPE: ICD-10-CM

## 2025-03-03 DIAGNOSIS — I10 HYPERTENSION, ESSENTIAL: Primary | ICD-10-CM

## 2025-03-03 PROCEDURE — 99214 OFFICE O/P EST MOD 30 MIN: CPT | Performed by: SPECIALIST

## 2025-03-03 PROCEDURE — 93000 ELECTROCARDIOGRAM COMPLETE: CPT | Performed by: SPECIALIST

## 2025-03-03 RX ORDER — DICYCLOMINE HCL 20 MG
TABLET ORAL
COMMUNITY
Start: 2024-08-16

## 2025-03-03 RX ORDER — CYCLOBENZAPRINE HCL 10 MG
TABLET ORAL
COMMUNITY

## 2025-03-03 RX ORDER — IVERMECTIN 10 MG/G
CREAM TOPICAL
COMMUNITY
Start: 2024-12-01

## 2025-03-03 RX ORDER — LEVOCETIRIZINE DIHYDROCHLORIDE 5 MG/1
TABLET, FILM COATED ORAL
COMMUNITY
Start: 2024-01-01

## 2025-03-03 RX ORDER — TIRZEPATIDE 10 MG/.5ML
INJECTION, SOLUTION SUBCUTANEOUS
COMMUNITY
Start: 2024-07-01

## 2025-03-03 RX ORDER — PILOCARPINE HYDROCHLORIDE 5 MG/1
TABLET, FILM COATED ORAL
COMMUNITY
Start: 2025-02-17

## 2025-03-03 RX ORDER — GABAPENTIN 100 MG/1
CAPSULE ORAL
COMMUNITY
Start: 2024-12-06

## 2025-03-03 RX ORDER — OXYMETAZOLINE HYDROCHLORIDE 30 G/1
CREAM TOPICAL
COMMUNITY
Start: 2024-12-01

## 2025-04-09 ENCOUNTER — RESULTS FOLLOW-UP (OUTPATIENT)
Dept: CARDIOLOGY | Facility: CLINIC | Age: 50
End: 2025-04-09
Payer: COMMERCIAL

## 2025-04-09 DIAGNOSIS — R07.9 CHEST PAIN, UNSPECIFIED TYPE: ICD-10-CM

## (undated) DEVICE — GOWN,REINF,POLY,ECL,PP SLV,XL: Brand: MEDLINE

## (undated) DEVICE — OBT BLADLES ENDOWRIST DAVINCI/S 8MM

## (undated) DEVICE — MANIP UTER ADVINCULA DELINEATOR W/ 4CM ULTEM PLSTC CUP

## (undated) DEVICE — CANNULA SEAL

## (undated) DEVICE — GLV SURG PREMIERPRO MIC LTX PF SZ8 BRN

## (undated) DEVICE — TRY LAC BHCOR

## (undated) DEVICE — SHEET,DRAPE,53X77,STERILE: Brand: MEDLINE

## (undated) DEVICE — ADULT DISPOSABLE SINGLE-PATIENT USE PULSE OXIMETER SENSOR: Brand: NONIN

## (undated) DEVICE — TOWEL,OR,DSP,ST,BLUE,STD,4/PK,20PK/CS: Brand: MEDLINE

## (undated) DEVICE — DRAPE,UTILTY,TAPE,15X26, 4EA/PK: Brand: MEDLINE

## (undated) DEVICE — COVER,MAYO STAND,STERILE: Brand: MEDLINE

## (undated) DEVICE — DRSNG SURESITE WNDW 4X4.5

## (undated) DEVICE — 40595 XL TRENDELENBURG POSITIONING KIT: Brand: 40595 XL TRENDELENBURG POSITIONING KIT

## (undated) DEVICE — TIP COVER ACCESSORY

## (undated) DEVICE — PK DAVINCI 70

## (undated) DEVICE — 2, DISPOSABLE SUCTION/IRRIGATOR WITH DISPOSABLE TIP: Brand: STRYKEFLOW

## (undated) DEVICE — Device

## (undated) DEVICE — PAD GRND REM POLYHESIVE A/ DISP

## (undated) DEVICE — SUCTION CANISTER, 1500CC, RIGID: Brand: DEROYAL

## (undated) DEVICE — LAPAROSCOPIC SCOPE WARMER: Brand: DEROYAL

## (undated) DEVICE — SEAL CANN CAM ENDOWRIST DAVINCI/S 8.5MM

## (undated) DEVICE — CANNULA,OXY,ADULT,SUPER SOFT,W/14'TUB,UC: Brand: MEDLINE INDUSTRIES, INC.

## (undated) DEVICE — ENDOPATH XCEL BLADELESS TROCARS WITH STABILITY SLEEVES: Brand: ENDOPATH XCEL

## (undated) DEVICE — ANTIBACTERIAL VIOLET BRAIDED (POLYGLACTIN 910), SYNTHETIC ABSORBABLE SURGICAL SUTURE: Brand: COATED VICRYL

## (undated) DEVICE — SUT MNCRYL 4/0 PS2 18 IN

## (undated) DEVICE — APPL CHLORAPREP W/TINT 26ML ORNG